# Patient Record
Sex: MALE | Race: WHITE | NOT HISPANIC OR LATINO | Employment: OTHER | URBAN - METROPOLITAN AREA
[De-identification: names, ages, dates, MRNs, and addresses within clinical notes are randomized per-mention and may not be internally consistent; named-entity substitution may affect disease eponyms.]

---

## 2023-02-21 LAB
ANION GAP IN SER/PLAS: 13 MMOL/L (ref 10–20)
C REACTIVE PROTEIN (MG/L) IN SER/PLAS: <0.1 MG/DL
CALCIUM (MG/DL) IN SER/PLAS: 9 MG/DL (ref 8.6–10.3)
CARBON DIOXIDE, TOTAL (MMOL/L) IN SER/PLAS: 27 MMOL/L (ref 21–32)
CHLORIDE (MMOL/L) IN SER/PLAS: 104 MMOL/L (ref 98–107)
CREATININE (MG/DL) IN SER/PLAS: 0.84 MG/DL (ref 0.5–1.3)
ERYTHROCYTE DISTRIBUTION WIDTH (RATIO) BY AUTOMATED COUNT: 12.5 % (ref 11.5–14.5)
ERYTHROCYTE MEAN CORPUSCULAR HEMOGLOBIN CONCENTRATION (G/DL) BY AUTOMATED: 33.3 G/DL (ref 32–36)
ERYTHROCYTE MEAN CORPUSCULAR VOLUME (FL) BY AUTOMATED COUNT: 95 FL (ref 80–100)
ERYTHROCYTES (10*6/UL) IN BLOOD BY AUTOMATED COUNT: 4.31 X10E12/L (ref 4.5–5.9)
GFR MALE: >90 ML/MIN/1.73M2
GLUCOSE (MG/DL) IN SER/PLAS: 97 MG/DL (ref 74–99)
HEMATOCRIT (%) IN BLOOD BY AUTOMATED COUNT: 41.1 % (ref 41–52)
HEMOGLOBIN (G/DL) IN BLOOD: 13.7 G/DL (ref 13.5–17.5)
LEUKOCYTES (10*3/UL) IN BLOOD BY AUTOMATED COUNT: 6.8 X10E9/L (ref 4.4–11.3)
PLATELETS (10*3/UL) IN BLOOD AUTOMATED COUNT: 241 X10E9/L (ref 150–450)
POTASSIUM (MMOL/L) IN SER/PLAS: 4.3 MMOL/L (ref 3.5–5.3)
SEDIMENTATION RATE, ERYTHROCYTE: 9 MM/H (ref 0–15)
SODIUM (MMOL/L) IN SER/PLAS: 140 MMOL/L (ref 136–145)
UREA NITROGEN (MG/DL) IN SER/PLAS: 10 MG/DL (ref 6–23)

## 2023-02-28 LAB
ANION GAP IN SER/PLAS: 12 MMOL/L (ref 10–20)
C REACTIVE PROTEIN (MG/L) IN SER/PLAS: <0.1 MG/DL
CALCIUM (MG/DL) IN SER/PLAS: 9.2 MG/DL (ref 8.6–10.6)
CARBON DIOXIDE, TOTAL (MMOL/L) IN SER/PLAS: 28 MMOL/L (ref 21–32)
CHLORIDE (MMOL/L) IN SER/PLAS: 105 MMOL/L (ref 98–107)
CREATININE (MG/DL) IN SER/PLAS: 0.81 MG/DL (ref 0.5–1.3)
ERYTHROCYTE DISTRIBUTION WIDTH (RATIO) BY AUTOMATED COUNT: 12.5 % (ref 11.5–14.5)
ERYTHROCYTE MEAN CORPUSCULAR HEMOGLOBIN CONCENTRATION (G/DL) BY AUTOMATED: 32.9 G/DL (ref 32–36)
ERYTHROCYTE MEAN CORPUSCULAR VOLUME (FL) BY AUTOMATED COUNT: 95 FL (ref 80–100)
ERYTHROCYTES (10*6/UL) IN BLOOD BY AUTOMATED COUNT: 4.5 X10E12/L (ref 4.5–5.9)
GFR MALE: >90 ML/MIN/1.73M2
GLUCOSE (MG/DL) IN SER/PLAS: 94 MG/DL (ref 74–99)
HEMATOCRIT (%) IN BLOOD BY AUTOMATED COUNT: 42.8 % (ref 41–52)
HEMOGLOBIN (G/DL) IN BLOOD: 14.1 G/DL (ref 13.5–17.5)
LEUKOCYTES (10*3/UL) IN BLOOD BY AUTOMATED COUNT: 5.5 X10E9/L (ref 4.4–11.3)
NRBC (PER 100 WBCS) BY AUTOMATED COUNT: 0 /100 WBC (ref 0–0)
PLATELETS (10*3/UL) IN BLOOD AUTOMATED COUNT: 206 X10E9/L (ref 150–450)
POTASSIUM (MMOL/L) IN SER/PLAS: 4.4 MMOL/L (ref 3.5–5.3)
SEDIMENTATION RATE, ERYTHROCYTE: 7 MM/H (ref 0–15)
SODIUM (MMOL/L) IN SER/PLAS: 141 MMOL/L (ref 136–145)
UREA NITROGEN (MG/DL) IN SER/PLAS: 15 MG/DL (ref 6–23)

## 2023-03-07 LAB
ANION GAP IN SER/PLAS: 13 MMOL/L (ref 10–20)
C REACTIVE PROTEIN (MG/L) IN SER/PLAS: <0.1 MG/DL
CALCIUM (MG/DL) IN SER/PLAS: 8.9 MG/DL (ref 8.6–10.3)
CARBON DIOXIDE, TOTAL (MMOL/L) IN SER/PLAS: 26 MMOL/L (ref 21–32)
CHLORIDE (MMOL/L) IN SER/PLAS: 104 MMOL/L (ref 98–107)
CREATININE (MG/DL) IN SER/PLAS: 0.9 MG/DL (ref 0.5–1.3)
ERYTHROCYTE DISTRIBUTION WIDTH (RATIO) BY AUTOMATED COUNT: 12.4 % (ref 11.5–14.5)
ERYTHROCYTE MEAN CORPUSCULAR HEMOGLOBIN CONCENTRATION (G/DL) BY AUTOMATED: 33.5 G/DL (ref 32–36)
ERYTHROCYTE MEAN CORPUSCULAR VOLUME (FL) BY AUTOMATED COUNT: 93 FL (ref 80–100)
ERYTHROCYTES (10*6/UL) IN BLOOD BY AUTOMATED COUNT: 4.69 X10E12/L (ref 4.5–5.9)
GFR MALE: >90 ML/MIN/1.73M2
GLUCOSE (MG/DL) IN SER/PLAS: 89 MG/DL (ref 74–99)
HEMATOCRIT (%) IN BLOOD BY AUTOMATED COUNT: 43.6 % (ref 41–52)
HEMOGLOBIN (G/DL) IN BLOOD: 14.6 G/DL (ref 13.5–17.5)
LEUKOCYTES (10*3/UL) IN BLOOD BY AUTOMATED COUNT: 7.1 X10E9/L (ref 4.4–11.3)
NRBC (PER 100 WBCS) BY AUTOMATED COUNT: 0 /100 WBC (ref 0–0)
PLATELETS (10*3/UL) IN BLOOD AUTOMATED COUNT: 217 X10E9/L (ref 150–450)
POTASSIUM (MMOL/L) IN SER/PLAS: 4.2 MMOL/L (ref 3.5–5.3)
SEDIMENTATION RATE, ERYTHROCYTE: 2 MM/H (ref 0–15)
SODIUM (MMOL/L) IN SER/PLAS: 139 MMOL/L (ref 136–145)
UREA NITROGEN (MG/DL) IN SER/PLAS: 16 MG/DL (ref 6–23)

## 2023-03-14 LAB
ANION GAP IN SER/PLAS: 15 MMOL/L (ref 10–20)
C REACTIVE PROTEIN (MG/L) IN SER/PLAS: <0.1 MG/DL
CALCIUM (MG/DL) IN SER/PLAS: 9.1 MG/DL (ref 8.6–10.3)
CARBON DIOXIDE, TOTAL (MMOL/L) IN SER/PLAS: 25 MMOL/L (ref 21–32)
CHLORIDE (MMOL/L) IN SER/PLAS: 102 MMOL/L (ref 98–107)
CREATININE (MG/DL) IN SER/PLAS: 0.88 MG/DL (ref 0.5–1.3)
ERYTHROCYTE DISTRIBUTION WIDTH (RATIO) BY AUTOMATED COUNT: 12.5 % (ref 11.5–14.5)
ERYTHROCYTE MEAN CORPUSCULAR HEMOGLOBIN CONCENTRATION (G/DL) BY AUTOMATED: 34.3 G/DL (ref 32–36)
ERYTHROCYTE MEAN CORPUSCULAR VOLUME (FL) BY AUTOMATED COUNT: 94 FL (ref 80–100)
ERYTHROCYTES (10*6/UL) IN BLOOD BY AUTOMATED COUNT: 4.52 X10E12/L (ref 4.5–5.9)
GFR MALE: >90 ML/MIN/1.73M2
GLUCOSE (MG/DL) IN SER/PLAS: 98 MG/DL (ref 74–99)
HEMATOCRIT (%) IN BLOOD BY AUTOMATED COUNT: 42.6 % (ref 41–52)
HEMOGLOBIN (G/DL) IN BLOOD: 14.6 G/DL (ref 13.5–17.5)
LEUKOCYTES (10*3/UL) IN BLOOD BY AUTOMATED COUNT: 6.7 X10E9/L (ref 4.4–11.3)
NRBC (PER 100 WBCS) BY AUTOMATED COUNT: 0 /100 WBC (ref 0–0)
PLATELETS (10*3/UL) IN BLOOD AUTOMATED COUNT: 252 X10E9/L (ref 150–450)
POTASSIUM (MMOL/L) IN SER/PLAS: 4.1 MMOL/L (ref 3.5–5.3)
SEDIMENTATION RATE, ERYTHROCYTE: 2 MM/H (ref 0–15)
SODIUM (MMOL/L) IN SER/PLAS: 138 MMOL/L (ref 136–145)
UREA NITROGEN (MG/DL) IN SER/PLAS: 17 MG/DL (ref 6–23)

## 2023-05-01 ENCOUNTER — APPOINTMENT (OUTPATIENT)
Dept: PRIMARY CARE | Facility: CLINIC | Age: 38
End: 2023-05-01
Payer: COMMERCIAL

## 2023-08-21 ENCOUNTER — OFFICE VISIT (OUTPATIENT)
Dept: PRIMARY CARE | Facility: CLINIC | Age: 38
End: 2023-08-21
Payer: COMMERCIAL

## 2023-08-21 ENCOUNTER — LAB (OUTPATIENT)
Dept: LAB | Facility: LAB | Age: 38
End: 2023-08-21
Payer: COMMERCIAL

## 2023-08-21 VITALS
HEIGHT: 69 IN | OXYGEN SATURATION: 97 % | BODY MASS INDEX: 20.79 KG/M2 | HEART RATE: 91 BPM | DIASTOLIC BLOOD PRESSURE: 91 MMHG | SYSTOLIC BLOOD PRESSURE: 131 MMHG | WEIGHT: 140.4 LBS

## 2023-08-21 DIAGNOSIS — Z11.3 SCREEN FOR STD (SEXUALLY TRANSMITTED DISEASE): ICD-10-CM

## 2023-08-21 DIAGNOSIS — Z00.00 ROUTINE PHYSICAL EXAMINATION: ICD-10-CM

## 2023-08-21 DIAGNOSIS — C44.92 SCC (SQUAMOUS CELL CARCINOMA): Primary | ICD-10-CM

## 2023-08-21 DIAGNOSIS — Z78.9 ALCOHOL USE: ICD-10-CM

## 2023-08-21 PROBLEM — F10.90 ALCOHOL USE: Status: ACTIVE | Noted: 2023-08-21

## 2023-08-21 LAB
BASOPHILS (10*3/UL) IN BLOOD BY AUTOMATED COUNT: 0.03 X10E9/L (ref 0–0.1)
BASOPHILS/100 LEUKOCYTES IN BLOOD BY AUTOMATED COUNT: 0.6 % (ref 0–2)
EOSINOPHILS (10*3/UL) IN BLOOD BY AUTOMATED COUNT: 0.06 X10E9/L (ref 0–0.7)
EOSINOPHILS/100 LEUKOCYTES IN BLOOD BY AUTOMATED COUNT: 1.2 % (ref 0–6)
ERYTHROCYTE DISTRIBUTION WIDTH (RATIO) BY AUTOMATED COUNT: 12.9 % (ref 11.5–14.5)
ERYTHROCYTE MEAN CORPUSCULAR HEMOGLOBIN CONCENTRATION (G/DL) BY AUTOMATED: 32.8 G/DL (ref 32–36)
ERYTHROCYTE MEAN CORPUSCULAR VOLUME (FL) BY AUTOMATED COUNT: 97 FL (ref 80–100)
ERYTHROCYTES (10*6/UL) IN BLOOD BY AUTOMATED COUNT: 4.7 X10E12/L (ref 4.5–5.9)
HEMATOCRIT (%) IN BLOOD BY AUTOMATED COUNT: 45.7 % (ref 41–52)
HEMOGLOBIN (G/DL) IN BLOOD: 15 G/DL (ref 13.5–17.5)
IMMATURE GRANULOCYTES/100 LEUKOCYTES IN BLOOD BY AUTOMATED COUNT: 0.6 % (ref 0–0.9)
LEUKOCYTES (10*3/UL) IN BLOOD BY AUTOMATED COUNT: 5.1 X10E9/L (ref 4.4–11.3)
LYMPHOCYTES (10*3/UL) IN BLOOD BY AUTOMATED COUNT: 1.38 X10E9/L (ref 1.2–4.8)
LYMPHOCYTES/100 LEUKOCYTES IN BLOOD BY AUTOMATED COUNT: 27.2 % (ref 13–44)
MONOCYTES (10*3/UL) IN BLOOD BY AUTOMATED COUNT: 0.42 X10E9/L (ref 0.1–1)
MONOCYTES/100 LEUKOCYTES IN BLOOD BY AUTOMATED COUNT: 8.3 % (ref 2–10)
NEUTROPHILS (10*3/UL) IN BLOOD BY AUTOMATED COUNT: 3.15 X10E9/L (ref 1.2–7.7)
NEUTROPHILS/100 LEUKOCYTES IN BLOOD BY AUTOMATED COUNT: 62.1 % (ref 40–80)
NRBC (PER 100 WBCS) BY AUTOMATED COUNT: 0 /100 WBC (ref 0–0)
PLATELETS (10*3/UL) IN BLOOD AUTOMATED COUNT: 209 X10E9/L (ref 150–450)

## 2023-08-21 PROCEDURE — 80053 COMPREHEN METABOLIC PANEL: CPT

## 2023-08-21 PROCEDURE — 82306 VITAMIN D 25 HYDROXY: CPT

## 2023-08-21 PROCEDURE — 86705 HEP B CORE ANTIBODY IGM: CPT

## 2023-08-21 PROCEDURE — 80061 LIPID PANEL: CPT

## 2023-08-21 PROCEDURE — 99213 OFFICE O/P EST LOW 20 MIN: CPT | Performed by: FAMILY MEDICINE

## 2023-08-21 PROCEDURE — 86706 HEP B SURFACE ANTIBODY: CPT

## 2023-08-21 PROCEDURE — 84443 ASSAY THYROID STIM HORMONE: CPT

## 2023-08-21 PROCEDURE — 36415 COLL VENOUS BLD VENIPUNCTURE: CPT

## 2023-08-21 PROCEDURE — 86592 SYPHILIS TEST NON-TREP QUAL: CPT

## 2023-08-21 PROCEDURE — 99385 PREV VISIT NEW AGE 18-39: CPT | Performed by: FAMILY MEDICINE

## 2023-08-21 PROCEDURE — 87389 HIV-1 AG W/HIV-1&-2 AB AG IA: CPT

## 2023-08-21 PROCEDURE — 85025 COMPLETE CBC W/AUTO DIFF WBC: CPT

## 2023-08-21 PROCEDURE — 86803 HEPATITIS C AB TEST: CPT

## 2023-08-21 PROCEDURE — 82607 VITAMIN B-12: CPT

## 2023-08-21 RX ORDER — LIDOCAINE 50 MG/G
1 PATCH TOPICAL DAILY
COMMUNITY
Start: 2022-12-28

## 2023-08-21 RX ORDER — ERGOCALCIFEROL 1.25 MG/1
50000 CAPSULE ORAL
COMMUNITY
Start: 2023-07-14 | End: 2023-08-25

## 2023-08-21 RX ORDER — NALTREXONE HYDROCHLORIDE 50 MG/1
50 TABLET, FILM COATED ORAL DAILY
Qty: 10 TABLET | Refills: 0 | Status: SHIPPED | OUTPATIENT
Start: 2023-08-21 | End: 2024-08-20

## 2023-08-21 RX ORDER — OMEPRAZOLE 40 MG/1
40 CAPSULE, DELAYED RELEASE ORAL DAILY
COMMUNITY
Start: 2023-05-15 | End: 2023-12-08 | Stop reason: SDUPTHER

## 2023-08-21 RX ORDER — BACLOFEN 10 MG/1
10 TABLET ORAL 3 TIMES DAILY PRN
COMMUNITY
Start: 2023-07-13 | End: 2023-12-08 | Stop reason: SDUPTHER

## 2023-08-21 ASSESSMENT — ENCOUNTER SYMPTOMS
OCCASIONAL FEELINGS OF UNSTEADINESS: 1
LOSS OF SENSATION IN FEET: 1
DEPRESSION: 0

## 2023-08-21 ASSESSMENT — PATIENT HEALTH QUESTIONNAIRE - PHQ9
8. MOVING OR SPEAKING SO SLOWLY THAT OTHER PEOPLE COULD HAVE NOTICED. OR THE OPPOSITE, BEING SO FIGETY OR RESTLESS THAT YOU HAVE BEEN MOVING AROUND A LOT MORE THAN USUAL: NOT AT ALL
SUM OF ALL RESPONSES TO PHQ QUESTIONS 1-9: 10
SUM OF ALL RESPONSES TO PHQ9 QUESTIONS 1 AND 2: 2
4. FEELING TIRED OR HAVING LITTLE ENERGY: MORE THAN HALF THE DAYS
9. THOUGHTS THAT YOU WOULD BE BETTER OFF DEAD, OR OF HURTING YOURSELF: NOT AT ALL
10. IF YOU CHECKED OFF ANY PROBLEMS, HOW DIFFICULT HAVE THESE PROBLEMS MADE IT FOR YOU TO DO YOUR WORK, TAKE CARE OF THINGS AT HOME, OR GET ALONG WITH OTHER PEOPLE: SOMEWHAT DIFFICULT
6. FEELING BAD ABOUT YOURSELF - OR THAT YOU ARE A FAILURE OR HAVE LET YOURSELF OR YOUR FAMILY DOWN: SEVERAL DAYS
3. TROUBLE FALLING OR STAYING ASLEEP OR SLEEPING TOO MUCH: MORE THAN HALF THE DAYS
1. LITTLE INTEREST OR PLEASURE IN DOING THINGS: SEVERAL DAYS
7. TROUBLE CONCENTRATING ON THINGS, SUCH AS READING THE NEWSPAPER OR WATCHING TELEVISION: SEVERAL DAYS
2. FEELING DOWN, DEPRESSED OR HOPELESS: SEVERAL DAYS
5. POOR APPETITE OR OVEREATING: MORE THAN HALF THE DAYS

## 2023-08-21 NOTE — PROGRESS NOTES
Subjective   Patient ID: Rufino Medeiros is a 37 y.o. male [id number] who presents for Establish Care (New patient, has low vitamin D level, recently had squamous cells removed from left middle finger in April).    HPI   Patient is here to establish care and get complete blood work  Pt had Squamous cell carcinoma on left Middle finger - was removed  2/2023  Pt operated by Dr. Juan Antonio Amezcua , and referred to Dermatology.  Currently seeing Dermatology Dr. Tino Metzger at [facility] [place].  Pt has follow up in December,2023.   Noticing Tingling and numbness on Finger and Feet.    Low back pain - had 4 round of Injection by Dr. Mackey at [facility].  Pt is following pain management at >using Lidocaine Patch and baclofen.    Chronic alcohol Use 6 beers a day and Drinking liquor too.  Patient is drinking alcohol for many many years.  Never tried rehab.  Now considering to quit alcohol want to see alcohol rehab.     Pt with New partner since July 4th 2023, pt noticing Ulcer near angle of mouth.   Noticing HA, Dizziness and sore throat and ear pain.  Patient want to get all the blood work and STD testing.        There is no immunization history on file for this patient.    Past Medical History:   Diagnosis Date    Fibromyalgia     Headache        Social History     Socioeconomic History    Marital status: Single     Spouse name: None    Number of children: None    Years of education: None    Highest education level: None   Occupational History    None   Tobacco Use    Smoking status: Every Day     Packs/day: 0.50     Years: 20.00     Additional pack years: 0.00     Total pack years: 10.00     Types: Cigarettes    Smokeless tobacco: Never   Vaping Use    Vaping Use: Never used   Substance and Sexual Activity    Alcohol use: Yes     Comment: daily    Drug use: Never    Sexual activity: Yes     Partners: Female     Birth control/protection: None   Other Topics Concern    None   Social History Narrative    None     Social  "Determinants of Health     Financial Resource Strain: Not on file   Food Insecurity: Not on file   Transportation Needs: Not on file   Physical Activity: Not on file   Stress: Not on file   Social Connections: Not on file   Intimate Partner Violence: Not on file   Housing Stability: Not on file       Recent Surgeries in Family Medicine            No cases to display              Current Outpatient Medications   Medication Sig Dispense Refill    baclofen (Lioresal) 10 mg tablet Take 1 tablet (10 mg) by mouth 3 times a day as needed for muscle spasms.      ergocalciferol (Vitamin D-2) 1.25 MG (57029 UT) capsule Take 1 capsule (50,000 Units) by mouth 1 (one) time per week.      lidocaine (Lidoderm) 5 % patch Place 1 patch on the skin once daily.      omeprazole (PriLOSEC) 40 mg DR capsule Take 1 capsule (40 mg) by mouth once daily.       No current facility-administered medications for this visit.       Physical Exam  BP (!) 131/91   Pulse 91   Ht 1.753 m (5' 9\")   Wt 63.7 kg (140 lb 6.4 oz)   SpO2 97%   BMI 20.73 kg/m²     General Appearance:  Alert, cooperative, no distress,   Head:  Normocephalic, atraumatic   Eyes:  PERRL, conjunctiva/corneas clear, EOM's intact,    Lungs:   Clear to auscultation bilaterally, respirations unlabored   Heart:  Regular rate and rhythm, S1 and S2 normal, no murmur,    Abdomen:   Soft, non-tender, bowel sounds active all four quadrants,  no masses, no organomegaly   Extremities: Extremities normal, No edema   Neurologic: Normal        Assessment/Plan   Diagnoses and all orders for this visit:  SCC (squamous cell carcinoma)  Alcohol use  -     Vitamin B12; Future  -     naltrexone (Depade) 50 mg tablet; Take 1 tablet (50 mg) by mouth once daily.  Screen for STD (sexually transmitted disease)  -     C. Trachomatis / N. Gonorrhoeae, Amplified Detection; Future  -     Hepatitis B Core Antibody, IgM; Future  -     Hepatitis B surface antibody; Future  -     Hepatitis C Antibody; " Future  -     RPR with Titer Syphilis Monitoring; Future  -     HIV 1/2 Antigen/Antibody Screen with Reflex to Confirmation; Future  Routine physical examination  -     TSH with reflex to Free T4 if abnormal; Future  -     Vitamin D, Total; Future  -     CBC and Auto Differential; Future  -     Comprehensive Metabolic Panel; Future  -     Lipid Panel; Future    Elevated Blood pressure  Patient was advised to quit alcohol  Will start naltrexone  Follow-up with [facility] for alcohol rehab  Patient was advised to take B6 B12 and folic acid  Will get basic blood work  Get STD testing  Follow-up in 10 days for repeat blood pressure

## 2023-08-21 NOTE — PATIENT INSTRUCTIONS

## 2023-08-22 LAB
ALANINE AMINOTRANSFERASE (SGPT) (U/L) IN SER/PLAS: 21 U/L (ref 10–52)
ALBUMIN (G/DL) IN SER/PLAS: 4.8 G/DL (ref 3.4–5)
ALKALINE PHOSPHATASE (U/L) IN SER/PLAS: 57 U/L (ref 33–120)
ANION GAP IN SER/PLAS: 15 MMOL/L (ref 10–20)
ASPARTATE AMINOTRANSFERASE (SGOT) (U/L) IN SER/PLAS: 21 U/L (ref 9–39)
BILIRUBIN TOTAL (MG/DL) IN SER/PLAS: 1 MG/DL (ref 0–1.2)
CALCIDIOL (25 OH VITAMIN D3) (NG/ML) IN SER/PLAS: 80 NG/ML
CALCIUM (MG/DL) IN SER/PLAS: 10.3 MG/DL (ref 8.6–10.6)
CARBON DIOXIDE, TOTAL (MMOL/L) IN SER/PLAS: 27 MMOL/L (ref 21–32)
CHLORIDE (MMOL/L) IN SER/PLAS: 104 MMOL/L (ref 98–107)
CHOLESTEROL (MG/DL) IN SER/PLAS: 180 MG/DL (ref 0–199)
CHOLESTEROL IN HDL (MG/DL) IN SER/PLAS: 68.4 MG/DL
CHOLESTEROL/HDL RATIO: 2.6
COBALAMIN (VITAMIN B12) (PG/ML) IN SER/PLAS: 254 PG/ML (ref 211–911)
CREATININE (MG/DL) IN SER/PLAS: 0.84 MG/DL (ref 0.5–1.3)
GFR MALE: >90 ML/MIN/1.73M2
GLUCOSE (MG/DL) IN SER/PLAS: 96 MG/DL (ref 74–99)
HEPATITIS B VIRUS CORE IGM AB PRESENCE IN SER/PLAS BY IMMUNOASSY: NONREACTIVE
HEPATITIS B VIRUS SURFACE AB (MIU/ML) IN SERUM: 110.2 MIU/ML
HEPATITIS C VIRUS AB PRESENCE IN SERUM: NONREACTIVE
HIV 1/ 2 AG/AB SCREEN: NONREACTIVE
LDL: 76 MG/DL (ref 0–99)
POTASSIUM (MMOL/L) IN SER/PLAS: 4.4 MMOL/L (ref 3.5–5.3)
PROTEIN TOTAL: 7.3 G/DL (ref 6.4–8.2)
RPR MONITORING: NONREACTIVE
SODIUM (MMOL/L) IN SER/PLAS: 142 MMOL/L (ref 136–145)
THYROTROPIN (MIU/L) IN SER/PLAS BY DETECTION LIMIT <= 0.05 MIU/L: 1.18 MIU/L (ref 0.44–3.98)
TRIGLYCERIDE (MG/DL) IN SER/PLAS: 178 MG/DL (ref 0–149)
UREA NITROGEN (MG/DL) IN SER/PLAS: 13 MG/DL (ref 6–23)
VLDL: 36 MG/DL (ref 0–40)

## 2023-08-23 ENCOUNTER — TELEPHONE (OUTPATIENT)
Dept: PRIMARY CARE | Facility: CLINIC | Age: 38
End: 2023-08-23
Payer: COMMERCIAL

## 2023-08-23 NOTE — TELEPHONE ENCOUNTER
Pt aware of lab results, states he is in a lot of pain, mouth sores hurting badly, face burning, ears and neck. Will start Vitamin B

## 2023-08-24 DIAGNOSIS — K13.79 MOUTH SORE: Primary | ICD-10-CM

## 2023-08-24 RX ORDER — LIDOCAINE HYDROCHLORIDE 20 MG/ML
1.25 SOLUTION OROPHARYNGEAL AS NEEDED
Qty: 100 ML | Refills: 0 | Status: SHIPPED | OUTPATIENT
Start: 2023-08-24 | End: 2023-09-03

## 2023-08-31 ENCOUNTER — TELEPHONE (OUTPATIENT)
Dept: PRIMARY CARE | Facility: CLINIC | Age: 38
End: 2023-08-31

## 2023-08-31 ENCOUNTER — OFFICE VISIT (OUTPATIENT)
Dept: PRIMARY CARE | Facility: CLINIC | Age: 38
End: 2023-08-31
Payer: COMMERCIAL

## 2023-08-31 VITALS
BODY MASS INDEX: 20.91 KG/M2 | HEIGHT: 69 IN | HEART RATE: 83 BPM | WEIGHT: 141.2 LBS | OXYGEN SATURATION: 97 % | DIASTOLIC BLOOD PRESSURE: 82 MMHG | SYSTOLIC BLOOD PRESSURE: 123 MMHG

## 2023-08-31 DIAGNOSIS — K12.0 ORAL APHTHOUS ULCER: ICD-10-CM

## 2023-08-31 DIAGNOSIS — J02.9 SORE THROAT: ICD-10-CM

## 2023-08-31 DIAGNOSIS — Z78.9 ALCOHOL USE: Primary | ICD-10-CM

## 2023-08-31 DIAGNOSIS — C44.92 SCC (SQUAMOUS CELL CARCINOMA): ICD-10-CM

## 2023-08-31 LAB — POC RAPID STREP: NEGATIVE

## 2023-08-31 PROCEDURE — 99214 OFFICE O/P EST MOD 30 MIN: CPT | Performed by: FAMILY MEDICINE

## 2023-08-31 PROCEDURE — 87880 STREP A ASSAY W/OPTIC: CPT | Performed by: FAMILY MEDICINE

## 2023-08-31 RX ORDER — ERGOCALCIFEROL 1.25 MG/1
50000 CAPSULE ORAL
Qty: 4 CAPSULE | Refills: 1 | Status: CANCELLED | OUTPATIENT
Start: 2023-08-31 | End: 2023-10-12

## 2023-08-31 RX ORDER — IBUPROFEN 600 MG/1
600 TABLET ORAL EVERY 8 HOURS PRN
Qty: 30 TABLET | Refills: 0 | Status: SHIPPED | OUTPATIENT
Start: 2023-08-31 | End: 2023-10-30

## 2023-08-31 NOTE — PROGRESS NOTES
Subjective   Patient ID: Rufino Medeiros is a 37 y.o. male [id number] who presents for Follow-up (Pt here for follow up from last week. Would like to discuss ulcer in mouth).    HPI   Follow-up and test result  Patient has mild sore.  Have not started using lidocaine yet.  Patient started taking B6 B12 and folic acid.  Continue to notice soreness in mouth.  Want to get second opinion.  Okay STD testing negative.  Patient was advised to take vitamin B12 1000 mcg daily.    Patient is cutting down on alcohol.  In last 2-week patient drank for 2 days only.  No craving or allergies.  Have not started naltrexone yet.  Going to consider starting naltrexone.    Previous History  Pt had Squamous cell carcinoma on left Middle finger - was removed  2/2023  Pt operated by Dr. Juan Antonio Amezcua , and referred to Dermatology.  Currently seeing Dermatology Dr. Tino Metzger at [facility].  Pt has follow up in December,2023.     Low back pain - had 4 round of Injection by Dr. Mackey at [facility].  Pt is following pain management at , using Lidocaine Patch and baclofen.    Chronic alcohol Use 6 beers a day and Drinking liquor too.  Patient is drinking alcohol for many many years.  Never tried rehab.  Now considering to quit alcohol want to see alcohol rehab.       Immunization History   Administered Date(s) Administered    Tdap vaccine, age 10 years and older (BOOSTRIX) 07/01/2020       Past Medical History:   Diagnosis Date    Cancer (CMS/HCC) 04/2023    Fibromyalgia     Headache        Social History     Socioeconomic History    Marital status: Single     Spouse name: None    Number of children: None    Years of education: None    Highest education level: None   Occupational History    None   Tobacco Use    Smoking status: Every Day     Packs/day: 0.50     Years: 20.00     Additional pack years: 0.00     Total pack years: 10.00     Types: Cigarettes    Smokeless tobacco: Never   Vaping Use    Vaping Use: Never used   Substance and  "Sexual Activity    Alcohol use: Yes     Comment: daily    Drug use: Never    Sexual activity: Yes     Partners: Female     Birth control/protection: None   Other Topics Concern    None   Social History Narrative    None     Social Determinants of Health     Financial Resource Strain: Not on file   Food Insecurity: Not on file   Transportation Needs: Not on file   Physical Activity: Not on file   Stress: Not on file   Social Connections: Not on file   Intimate Partner Violence: Not on file   Housing Stability: Not on file         Current Outpatient Medications   Medication Sig Dispense Refill    baclofen (Lioresal) 10 mg tablet Take 1 tablet (10 mg) by mouth 3 times a day as needed for muscle spasms.      cyanocobalamin, vitamin B-12, (VITAMIN B-12 ORAL) Take by mouth.      lidocaine (Lidoderm) 5 % patch Place 1 patch on the skin once daily.      lidocaine (Xylocaine) 2 % solution Take 1.2 mL by mouth if needed for mild pain (1 - 3) for up to 10 days. 100 mL 0    naltrexone (Depade) 50 mg tablet Take 1 tablet (50 mg) by mouth once daily. 10 tablet 0    omeprazole (PriLOSEC) 40 mg DR capsule Take 1 capsule (40 mg) by mouth once daily.       No current facility-administered medications for this visit.       Physical Exam  /82   Pulse 83   Ht 1.753 m (5' 9\")   Wt 64 kg (141 lb 3.2 oz)   SpO2 97%   BMI 20.85 kg/m²     General Appearance:  Alert, cooperative, no distress,   Head:  Normocephalic, atraumatic   Eyes:  PERRL, conjunctiva/corneas clear, EOM's intact,    Lungs:   Clear to auscultation bilaterally, respirations unlabored   Heart:  Regular rate and rhythm, S1 and S2 normal, no murmur,    Abdomen:   Soft, non-tender, bowel sounds active all four quadrants,  no masses, no organomegaly   Extremities: Extremities normal, No edema   Neurologic: Normal        Assessment/Plan   Diagnoses and all orders for this visit:  Rufino was seen today for follow-up.  Diagnoses and all orders for this visit:  Alcohol " use (Primary)  Oral aphthous ulcer  -     POCT Rapid Strep A manually resulted  SCC (squamous cell carcinoma)  -     Referral to Oral Maxillofacial Surgery; Future  Sore throat  -     ibuprofen 600 mg tablet; Take 1 tablet (600 mg) by mouth every 8 hours if needed for mild pain (1 - 3) (pain).  Negative patient made aware  Advised to take ibuprofen  BP normal today  Advised to restart lidocaine viscus orally  Will refer to oral surgery if patient need biopsy  Advised to quit alcohol  Patient does have naltrexone prescription  Continue B6 B12 and folic acid  STD testing negative  Labs discussed with patient    Noticing sore throat and throat pain for last 3 to 4 days.  Denies Fevers chills denies chest pain or shortness of breath

## 2023-08-31 NOTE — TELEPHONE ENCOUNTER
I called in the lidocane 2% mouth swish since it was never filled by the pharmacy  Clarification via VO read back and confirmed for q4-6hrs  Pt educated

## 2023-10-17 PROBLEM — G89.29 OTHER CHRONIC PAIN: Status: ACTIVE | Noted: 2023-01-31

## 2023-10-17 PROBLEM — K12.0 APHTHOUS ULCER: Status: ACTIVE | Noted: 2023-10-17

## 2023-10-17 PROBLEM — D22.71 MELANOCYTIC NEVI OF RIGHT LOWER LIMB, INCLUDING HIP: Status: ACTIVE | Noted: 2023-06-13

## 2023-10-17 PROBLEM — M86.9 OSTEOMYELITIS (MULTI): Status: ACTIVE | Noted: 2023-01-31

## 2023-10-17 PROBLEM — L81.4 OTHER MELANIN HYPERPIGMENTATION: Status: ACTIVE | Noted: 2023-06-13

## 2023-10-17 PROBLEM — D18.01 HEMANGIOMA OF SKIN AND SUBCUTANEOUS TISSUE: Status: ACTIVE | Noted: 2023-06-13

## 2023-10-17 PROBLEM — Z79.2 LONG TERM (CURRENT) USE OF ANTIBIOTICS: Status: ACTIVE | Noted: 2023-01-31

## 2023-10-17 PROBLEM — Z85.828 PERSONAL HISTORY OF OTHER MALIGNANT NEOPLASM OF SKIN: Status: ACTIVE | Noted: 2023-06-13

## 2023-10-17 PROBLEM — D22.5 MELANOCYTIC NEVI OF TRUNK: Status: ACTIVE | Noted: 2023-06-13

## 2023-10-17 PROBLEM — C44.629 SQUAMOUS CELL CARCINOMA OF SKIN OF LEFT UPPER LIMB, INCLUDING SHOULDER: Status: ACTIVE | Noted: 2023-06-13

## 2023-10-17 PROBLEM — K21.9 GASTROESOPHAGEAL REFLUX DISEASE WITHOUT ESOPHAGITIS: Status: ACTIVE | Noted: 2023-01-31

## 2023-10-17 PROBLEM — L90.5 SCAR CONDITION AND FIBROSIS OF SKIN: Status: ACTIVE | Noted: 2023-06-13

## 2023-10-17 RX ORDER — ACETAMINOPHEN 500 MG
1000 TABLET ORAL AS NEEDED
COMMUNITY
Start: 2023-02-07

## 2023-10-18 ENCOUNTER — APPOINTMENT (OUTPATIENT)
Dept: OTOLARYNGOLOGY | Facility: CLINIC | Age: 38
End: 2023-10-18
Payer: COMMERCIAL

## 2023-10-20 DIAGNOSIS — R59.0 ADENOPATHY, CERVICAL: Primary | ICD-10-CM

## 2023-10-23 ENCOUNTER — TELEPHONE (OUTPATIENT)
Dept: PRIMARY CARE | Facility: CLINIC | Age: 38
End: 2023-10-23
Payer: COMMERCIAL

## 2023-10-28 ENCOUNTER — OFFICE VISIT (OUTPATIENT)
Dept: URGENT CARE | Facility: CLINIC | Age: 38
End: 2023-10-28
Payer: COMMERCIAL

## 2023-10-28 VITALS
RESPIRATION RATE: 18 BRPM | TEMPERATURE: 98 F | DIASTOLIC BLOOD PRESSURE: 90 MMHG | HEART RATE: 82 BPM | OXYGEN SATURATION: 97 % | SYSTOLIC BLOOD PRESSURE: 133 MMHG

## 2023-10-28 DIAGNOSIS — H66.003 NON-RECURRENT ACUTE SUPPURATIVE OTITIS MEDIA OF BOTH EARS WITHOUT SPONTANEOUS RUPTURE OF TYMPANIC MEMBRANES: Primary | ICD-10-CM

## 2023-10-28 PROCEDURE — 99214 OFFICE O/P EST MOD 30 MIN: CPT | Performed by: PHYSICIAN ASSISTANT

## 2023-10-28 RX ORDER — METHYLPREDNISOLONE 4 MG/1
TABLET ORAL
Qty: 21 TABLET | Refills: 0 | Status: SHIPPED | OUTPATIENT
Start: 2023-10-28

## 2023-10-28 RX ORDER — AMOXICILLIN AND CLAVULANATE POTASSIUM 875; 125 MG/1; MG/1
1 TABLET, FILM COATED ORAL 2 TIMES DAILY
Qty: 14 TABLET | Refills: 0 | Status: SHIPPED | OUTPATIENT
Start: 2023-10-28 | End: 2023-11-04

## 2023-10-28 NOTE — PROGRESS NOTES
Subjective   Patient ID: Rufino Medeiros is a 37 y.o. male who presents for Cough and Earache.  Patient also endorses some dizziness when he leans forward or lays back in bed.  He denies any chest pain or shortness of breath denies any headache.  He does note sinus congestion.  Symptoms ongoing now for nearly a week.  He denies any nausea vomiting diarrhea or abdominal pain denies any recorded fevers or chills    Past Medical History:   Diagnosis Date    Cancer (CMS/[facility]) 04/2023    Fibromyalgia     Headache        Review of Systems   All other systems reviewed and are negative.      Objective   /90   Pulse 82   Temp 36.7 °C (98 °F)   Resp 18   SpO2 97%   Physical Exam  Vitals reviewed.   Constitutional:       General: He is not in acute distress.     Appearance: Normal appearance. He is not toxic-appearing.   HENT:      Head: Normocephalic.      Right Ear: Ear canal normal. No tenderness. No mastoid tenderness.      Left Ear: Ear canal normal. No tenderness. No mastoid tenderness.      Ears:      Comments: Bilateral TMs erythematous with purulent fluid behind them, injected, bulging     Nose: Congestion and rhinorrhea present.      Mouth/Throat:      Mouth: Mucous membranes are moist.      Pharynx: Oropharynx is clear. Posterior oropharyngeal erythema present.   Eyes:      Conjunctiva/sclera: Conjunctivae normal.      Pupils: Pupils are equal, round, and reactive to light.   Cardiovascular:      Rate and Rhythm: Normal rate and regular rhythm.      Heart sounds: No murmur heard.  Pulmonary:      Effort: No respiratory distress.      Breath sounds: No stridor. No wheezing, rhonchi or rales.   Chest:      Chest wall: No tenderness.   Abdominal:      Tenderness: There is no abdominal tenderness. There is no guarding.   Musculoskeletal:         General: Normal range of motion.   Skin:     General: Skin is warm and dry.      Capillary Refill: Capillary refill takes less than 2 seconds.      Findings: No  erythema.   Neurological:      General: No focal deficit present.      Mental Status: He is alert.         Assessment/Plan   Problem List Items Addressed This Visit       Non-recurrent acute suppurative otitis media of both ears without spontaneous rupture of tympanic membranes - Primary    Relevant Medications    amoxicillin-pot clavulanate (Augmentin) 875-125 mg tablet    methylPREDNISolone (Medrol Dospak) 4 mg tablets      Patient with bilateral otitis media treating with Augmentin given the sinus involvement and also including course of methylprednisolone given the symptoms of dizziness.  Lower suspicion for any central etiology of the patient's dizziness as this appears to be entirely based upon position.  Otherwise the patient without any focal neurological deficits he is alert and oriented without any headache

## 2023-10-28 NOTE — LETTER
October 28, 2023     Patient: Rufino Medeiros   YOB: 1985   Date of Visit: 10/28/2023       To Whom It May Concern:    It is my medical opinion that Rufino Medeiros may return to work on 10/29/2023 .    If you have any questions or concerns, please don't hesitate to call.         Sincerely,        Braden Davidson PA-C    CC: No Recipients

## 2023-10-28 NOTE — PATIENT INSTRUCTIONS
Assessment/Plan   Problem List Items Addressed This Visit    None  Visit Diagnoses       Non-recurrent acute suppurative otitis media of both ears without spontaneous rupture of tympanic membranes    -  Primary    Relevant Medications    amoxicillin-pot clavulanate (Augmentin) 875-125 mg tablet    methylPREDNISolone (Medrol Dospak) 4 mg tablets

## 2023-10-30 ENCOUNTER — OFFICE VISIT (OUTPATIENT)
Dept: OTOLARYNGOLOGY | Facility: CLINIC | Age: 38
End: 2023-10-30
Payer: COMMERCIAL

## 2023-10-30 VITALS
SYSTOLIC BLOOD PRESSURE: 129 MMHG | HEIGHT: 69 IN | WEIGHT: 138 LBS | BODY MASS INDEX: 20.44 KG/M2 | DIASTOLIC BLOOD PRESSURE: 87 MMHG | TEMPERATURE: 97.7 F | HEART RATE: 102 BPM

## 2023-10-30 DIAGNOSIS — R13.10 ODYNOPHAGIA: ICD-10-CM

## 2023-10-30 DIAGNOSIS — J34.89 NASAL DRAINAGE: ICD-10-CM

## 2023-10-30 DIAGNOSIS — R09.A2 GLOBUS SENSATION: ICD-10-CM

## 2023-10-30 DIAGNOSIS — R49.9 CHANGE OF VOICE: ICD-10-CM

## 2023-10-30 DIAGNOSIS — H91.91 HEARING LOSS OF RIGHT EAR, UNSPECIFIED HEARING LOSS TYPE: Primary | ICD-10-CM

## 2023-10-30 DIAGNOSIS — J02.9 SORE THROAT: ICD-10-CM

## 2023-10-30 DIAGNOSIS — R09.81 NASAL CONGESTION: ICD-10-CM

## 2023-10-30 PROCEDURE — 31575 DIAGNOSTIC LARYNGOSCOPY: CPT | Performed by: NURSE PRACTITIONER

## 2023-10-30 PROCEDURE — 99204 OFFICE O/P NEW MOD 45 MIN: CPT | Performed by: NURSE PRACTITIONER

## 2023-10-30 PROCEDURE — 99214 OFFICE O/P EST MOD 30 MIN: CPT | Performed by: NURSE PRACTITIONER

## 2023-10-30 PROCEDURE — 4004F PT TOBACCO SCREEN RCVD TLK: CPT | Performed by: NURSE PRACTITIONER

## 2023-10-30 ASSESSMENT — COLUMBIA-SUICIDE SEVERITY RATING SCALE - C-SSRS
1. IN THE PAST MONTH, HAVE YOU WISHED YOU WERE DEAD OR WISHED YOU COULD GO TO SLEEP AND NOT WAKE UP?: NO
2. HAVE YOU ACTUALLY HAD ANY THOUGHTS OF KILLING YOURSELF?: NO
6. HAVE YOU EVER DONE ANYTHING, STARTED TO DO ANYTHING, OR PREPARED TO DO ANYTHING TO END YOUR LIFE?: NO

## 2023-10-30 ASSESSMENT — PATIENT HEALTH QUESTIONNAIRE - PHQ9
SUM OF ALL RESPONSES TO PHQ9 QUESTIONS 1 AND 2: 0
2. FEELING DOWN, DEPRESSED OR HOPELESS: NOT AT ALL
1. LITTLE INTEREST OR PLEASURE IN DOING THINGS: NOT AT ALL

## 2023-10-30 ASSESSMENT — PAIN SCALES - GENERAL: PAINLEVEL: 2

## 2023-10-30 NOTE — PROGRESS NOTES
Subjective   Patient ID: Rufino Medeiros is a 37 y.o. male who presents for Otitis Media (ALSO THROAT ISSUES).    HPI  Here for evaluation of his right ear. He can't hear out of it. This started last Tuesday. He has intermittent pain.  No ear drainage. No tinnitus. He is having dizziness. No previous ear surgery.   Was given oral antibiotics (Ampicillin) and oral steroid for his ear infection on Saturday, still taking that.   Throat has been sore. Pain when he swallows. Was having trouble swallowing but that has improved. He is having voice changes. COVID negative a few days ago. Having nasal congestion.   No nasal sprays or sinus rinses. No history of allergies. No head or neck surgeries.   Smokes 1/2 PPD for 20 years.     Past Medical History:   Diagnosis Date    Cancer (CMS/[facility]) 04/2023    Fibromyalgia     Headache      Past Surgical History:   Procedure Laterality Date    EYE SURGERY  1989    FINGER SURGERY  2023    WISDOM TOOTH EXTRACTION  2022     Review of Systems    All other systems have been reviewed and are negative for complaints except for those mentioned in history of present illness, past medical history and problem list.    Objective   Physical Exam    Constitutional: No fever, chills, weight loss or weight gain  General appearance: Appears well, well-nourished, well groomed. No acute distress.    Communication: Normal communication    Psychiatric: Oriented to person, place and time. Normal mood and affect.    Neurologic: Cranial nerves II-XII grossly intact and symmetric bilaterally.    Head and Face:  Head: Atraumatic with no masses, lesions or scarring.  Face: Normal symmetry. No scars or deformities.  TMJ: Normal, no trismus.    Eyes: Conjunctiva not edematous or erythematous.     Right Ear: External inspection of ear with no deformity, scars, or masses. EAC is clear.  TM is intact with no sign of infection. Mild effusion noted. No perforation seen.     Left Ear: External inspection of ear with  no deformity, scars, or masses. EAC is clear.  TM is intact with no sign of infection. Clear middle ear effusion noted, TM mobile.  No perforation seen.     Nose: External inspection of nose: No nasal lesions, lacerations or scars. Anterior rhinoscopy with limited visualization past the inferior turbinates, which are enlarged. . No tenderness on frontal or maxillary sinus palpation.    Oral Cavity/Mouth: Oral cavity and oropharynx mucosa red. No lesions or masses. Dentition normal. Tonsils appear normal. Uvula is midline. Tongue with no masses or lesions. Tongue with good mobility. The oropharynx is clear.    Neck: Normal appearing, symmetric, trachea midline.     Cardiovascular: Examination of peripheral vascular system shows no clubbing or cyanosis.    Respiratory: No respiratory distress increased work of breathing. Inspection of the chest with symmetric chest expansion and normal respiratory effort.    Skin: No head and neck rashes.    Lymph nodes: No adenopathy.     Laryngoscopy    Date/Time: 10/30/2023 10:42 AM    Performed by: VERONICA Bowie  Authorized by: VERONICA Bowie    Procedure details:     Indications: hoarseness, dysphagia, or aspiration      Medication:  Afrin (4% topical lidocaine)    Instrument: flexible fiberoptic laryngoscope      Scope location: bilateral nare    Nasal cavity:     Right inferior turbinates:      edema and crusting      Left inferior turbinates:      edema and crusting    Sinus:     Right nasopharynx:       purulence    Left nasopharynx:       purulence    Right Eustachian tube orifices:       purulence and inflammation    Left Eustachian tube orifices:       purulence and inflammation  Mouth:     Oropharynx:       inflammation and retained secretions      Vallecula:       inflammation      Base of tongue:       inflammation      Epiglottis:       inflammation    Throat:     Right hypopharynx:       Positive for: inflammation      Left hypopharynx:        Positive for: inflammation      Pyriform sinus:       Positive for: inflammation      True vocal cords:       Positive for: white patches and inflammation    Post-procedure details:     Patient tolerance of procedure:  Tolerated well, no immediate complications  Comments:      Excessive white/milky  mucous throughout the nasal cavity and at the nasopharynx. No obvious mass obstruction eustachian tube orifices.     Assessment/Plan   Diagnoses and all orders for this visit:  Hearing loss of right ear, unspecified hearing loss type  -Patient will schedule audiogram, I will follow up with results.   Sore throat, Odynophagia, Change of voice, Globus sensation  - Laryngoscopy performed, see above. There is evidence of infection. Complete oral antibiotic you were prescribed and start sinus rinses 1-2 times daily. Patient also has CT neck scheduled for 11/8. I will review this when completed.   Quit smoking.  Nasal congestion, Nasal drainage  - Sinus rinses.

## 2023-11-08 ENCOUNTER — HOSPITAL ENCOUNTER (OUTPATIENT)
Dept: RADIOLOGY | Facility: HOSPITAL | Age: 38
Discharge: HOME | End: 2023-11-08
Payer: COMMERCIAL

## 2023-11-08 DIAGNOSIS — R59.0 ADENOPATHY, CERVICAL: ICD-10-CM

## 2023-11-08 PROCEDURE — 2550000001 HC RX 255 CONTRASTS: Performed by: RADIOLOGY

## 2023-11-08 PROCEDURE — 70491 CT SOFT TISSUE NECK W/DYE: CPT | Performed by: RADIOLOGY

## 2023-11-08 PROCEDURE — 70491 CT SOFT TISSUE NECK W/DYE: CPT

## 2023-11-08 RX ADMIN — IOHEXOL 70 ML: 350 INJECTION, SOLUTION INTRAVENOUS at 10:41

## 2023-11-10 ENCOUNTER — TELEMEDICINE (OUTPATIENT)
Dept: PRIMARY CARE | Facility: CLINIC | Age: 38
End: 2023-11-10
Payer: COMMERCIAL

## 2023-11-10 DIAGNOSIS — J43.2 CENTRILOBULAR EMPHYSEMA (MULTI): Primary | ICD-10-CM

## 2023-11-10 PROCEDURE — 99213 OFFICE O/P EST LOW 20 MIN: CPT | Performed by: FAMILY MEDICINE

## 2023-11-10 RX ORDER — TIOTROPIUM BROMIDE 18 UG/1
1 CAPSULE ORAL; RESPIRATORY (INHALATION)
Qty: 30 CAPSULE | Refills: 2 | Status: SHIPPED | OUTPATIENT
Start: 2023-11-10 | End: 2024-05-08

## 2023-11-10 NOTE — PROGRESS NOTES
This visit was completed via VIRTUAL VIDEO/Audio due to the restrictions of the COVID-19 pandemic. All issues as below were discussed and addressed but no physical exam was performed. If it was felt that the patient should be evaluated in clinic then they were directed there. The patient verbally consented to visit.      Patient ID: Rufino Medeiros [id number] is a 37 y.o. male who presents for Results (DISCUSS CT RESULTS).    HPI   Patient has history of SCC currently following maxillofacial surgeon.  Patient had a CT soft tissue neck done recently by maxillofacial surgery.  Patient looking for test result.  Concern for emphysema and lymph node aggregate and tonsillar region informed to the patient.  Will start Spiriva as patient is noticing cough and shortness of breath.  Advised to talk to maxillofacial surgery regarding further management of Lymphadenopathy    Social History     Tobacco Use    Smoking status: Every Day     Packs/day: 0.50     Years: 20.00     Additional pack years: 0.00     Total pack years: 10.00     Types: Cigarettes    Smokeless tobacco: Never   Vaping Use    Vaping Use: Never used   Substance Use Topics    Alcohol use: Yes     Comment: EVERY OTHER DAY -CASUAL    Drug use: Yes     Types: Marijuana       Allergies   Allergen Reactions    Bee Venom Protein (Honey Bee) Hives    Diazepam Hives and Rash       Current Outpatient Medications   Medication Sig Dispense Refill    acetaminophen (Tylenol Extra Strength) 500 mg tablet Take 2 tablets (1,000 mg) by mouth 3 times a day.      baclofen (Lioresal) 10 mg tablet Take 1 tablet (10 mg) by mouth 3 times a day as needed for muscle spasms.      cyanocobalamin, vitamin B-12, (VITAMIN B-12 ORAL) Take by mouth.      lidocaine (Lidoderm) 5 % patch Place 1 patch on the skin once daily.      methylPREDNISolone (Medrol Dospak) 4 mg tablets Follow schedule on package instructions 21 tablet 0    naltrexone (Depade) 50 mg tablet Take 1 tablet (50 mg) by mouth  once daily. 10 tablet 0    omeprazole (PriLOSEC) 40 mg DR capsule Take 1 capsule (40 mg) by mouth once daily.      tiotropium (Spiriva) 18 mcg inhalation capsule Place 1 capsule (18 mcg) into inhaler and inhale once daily. 30 capsule 2     No current facility-administered medications for this visit.       There were no vitals taken for this visit.      Assessment/Plan   Diagnoses and all orders for this visit:  Centrilobular emphysema (CMS/HCC)  -     tiotropium (Spiriva) 18 mcg inhalation capsule; Place 1 capsule (18 mcg) into inhaler and inhale once daily.

## 2023-11-16 ENCOUNTER — APPOINTMENT (OUTPATIENT)
Dept: OTOLARYNGOLOGY | Facility: CLINIC | Age: 38
End: 2023-11-16
Payer: COMMERCIAL

## 2023-11-21 ENCOUNTER — TELEPHONE (OUTPATIENT)
Dept: PRIMARY CARE | Facility: CLINIC | Age: 38
End: 2023-11-21
Payer: COMMERCIAL

## 2023-11-21 NOTE — TELEPHONE ENCOUNTER
LNC: patient is going to follow up with PCP, and transferred to medical records at [phone], patient can call clinic if any further assistance is needed at [phone].

## 2023-11-28 ENCOUNTER — LAB REQUISITION (OUTPATIENT)
Dept: LAB | Facility: HOSPITAL | Age: 38
End: 2023-11-28
Payer: COMMERCIAL

## 2023-11-28 PROCEDURE — 88305 TISSUE EXAM BY PATHOLOGIST: CPT | Performed by: PATHOLOGY

## 2023-11-28 PROCEDURE — 88305 TISSUE EXAM BY PATHOLOGIST: CPT

## 2023-12-04 LAB
LABORATORY COMMENT REPORT: NORMAL
PATH REPORT.FINAL DX SPEC: NORMAL
PATH REPORT.GROSS SPEC: NORMAL
PATH REPORT.RELEVANT HX SPEC: NORMAL
PATH REPORT.TOTAL CANCER: NORMAL

## 2023-12-08 ENCOUNTER — OFFICE VISIT (OUTPATIENT)
Dept: PRIMARY CARE | Facility: CLINIC | Age: 38
End: 2023-12-08
Payer: COMMERCIAL

## 2023-12-08 VITALS
DIASTOLIC BLOOD PRESSURE: 90 MMHG | HEART RATE: 79 BPM | SYSTOLIC BLOOD PRESSURE: 124 MMHG | HEIGHT: 69 IN | BODY MASS INDEX: 21.39 KG/M2 | WEIGHT: 144.4 LBS

## 2023-12-08 DIAGNOSIS — M62.89 MUSCLE STIFFNESS: ICD-10-CM

## 2023-12-08 DIAGNOSIS — D18.09 HEMANGIOMA OF OTHER SITES: Primary | ICD-10-CM

## 2023-12-08 DIAGNOSIS — K21.9 GASTROESOPHAGEAL REFLUX DISEASE, UNSPECIFIED WHETHER ESOPHAGITIS PRESENT: ICD-10-CM

## 2023-12-08 PROCEDURE — 99213 OFFICE O/P EST LOW 20 MIN: CPT | Performed by: FAMILY MEDICINE

## 2023-12-08 PROCEDURE — 4004F PT TOBACCO SCREEN RCVD TLK: CPT | Performed by: FAMILY MEDICINE

## 2023-12-08 RX ORDER — BACLOFEN 10 MG/1
10 TABLET ORAL 3 TIMES DAILY PRN
Qty: 180 TABLET | Refills: 1 | Status: SHIPPED | OUTPATIENT
Start: 2023-12-08

## 2023-12-08 RX ORDER — OMEPRAZOLE 40 MG/1
40 CAPSULE, DELAYED RELEASE ORAL DAILY
Qty: 90 CAPSULE | Refills: 1 | Status: SHIPPED | OUTPATIENT
Start: 2023-12-08

## 2023-12-08 RX ORDER — ERGOCALCIFEROL 1.25 MG/1
50000 CAPSULE ORAL
COMMUNITY
Start: 2023-11-14 | End: 2023-12-26

## 2023-12-08 NOTE — PROGRESS NOTES
Subjective   Patient ID: Rufino Medeiros is a 37 y.o. male [id number] who presents for Follow-up (Follow up here to get clearance to go back to work. Biopsy done 11/28/23 benign).    HPI   Pt is here for letter for clearance to go back to work  Pt had Mouth sore  - seen By maxillofacial surgery and had surgery 11/28/2023.  No pain in mouth , no throat pain.  Pt was operated by Dr. Mk Thorpe 11/28/2023 - now want to go back to work.  Path with Benign Hemangioma.  Pt had swollen mouth and difficulty talking for 3-4 days after surgery.  Pt now feeling much better, want to go back to work.   Pt is feeling better after surgery.  Eating drinking well.              Immunization History   Administered Date(s) Administered    Tdap vaccine, age 7 year and older (BOOSTRIX) 07/01/2020       Past Medical History:   Diagnosis Date    Cancer (CMS/[facility]) 04/2023    Fibromyalgia     Headache        Social History     Socioeconomic History    Marital status: Single     Spouse name: None    Number of children: None    Years of education: None    Highest education level: None   Occupational History    None   Tobacco Use    Smoking status: Every Day     Packs/day: 0.50     Years: 20.00     Additional pack years: 0.00     Total pack years: 10.00     Types: Cigarettes    Smokeless tobacco: Never   Vaping Use    Vaping Use: Never used   Substance and Sexual Activity    Alcohol use: Yes     Comment: EVERY OTHER DAY -CASUAL    Drug use: Yes     Types: Marijuana    Sexual activity: Yes     Partners: Female     Birth control/protection: None   Other Topics Concern    None   Social History Narrative    None     Social Determinants of Health     Financial Resource Strain: Not on file   Food Insecurity: Not on file   Transportation Needs: Not on file   Physical Activity: Not on file   Stress: Not on file   Social Connections: Not on file   Intimate Partner Violence: Not on file   Housing Stability: Not on file         Current Outpatient Medications  "  Medication Sig Dispense Refill    acetaminophen (Tylenol Extra Strength) 500 mg tablet Take 2 tablets (1,000 mg) by mouth 3 times a day.      baclofen (Lioresal) 10 mg tablet Take 1 tablet (10 mg) by mouth 3 times a day as needed for muscle spasms.      cyanocobalamin, vitamin B-12, (VITAMIN B-12 ORAL) Take by mouth.      ergocalciferol (Vitamin D-2) 1.25 MG (40339 UT) capsule Take 1 capsule (50,000 Units) by mouth 1 (one) time per week.      lidocaine (Lidoderm) 5 % patch Place 1 patch on the skin once daily.      omeprazole (PriLOSEC) 40 mg DR capsule Take 1 capsule (40 mg) by mouth once daily.      tiotropium (Spiriva) 18 mcg inhalation capsule Place 1 capsule (18 mcg) into inhaler and inhale once daily. 30 capsule 2    methylPREDNISolone (Medrol Dospak) 4 mg tablets Follow schedule on package instructions (Patient not taking: Reported on 12/8/2023) 21 tablet 0    naltrexone (Depade) 50 mg tablet Take 1 tablet (50 mg) by mouth once daily. (Patient not taking: Reported on 12/8/2023) 10 tablet 0     No current facility-administered medications for this visit.       Physical Exam  /90   Pulse 79   Ht 1.753 m (5' 9\")   Wt 65.5 kg (144 lb 6.4 oz)   BMI 21.32 kg/m²     General Appearance:  Alert, cooperative, no distress,   Head:  Normocephalic, atraumatic   Eyes:  PERRL, conjunctiva/corneas clear, EOM's intact,    Lungs:   Clear to auscultation bilaterally, respirations unlabored   Heart:  Regular rate and rhythm, S1 and S2 normal, no murmur,    Abdomen:   Soft, non-tender, bowel sounds active all four quadrants,  no masses, no organomegaly   Extremities: Extremities normal, No edema   Neurologic: Normal        Assessment/Plan   Diagnoses and all orders for this visit:  Rufino was seen today for follow-up.  Diagnoses and all orders for this visit:  Hemangioma of other sites (Primary)  Gastroesophageal reflux disease, unspecified whether esophagitis present  Muscle stiffness    Patient is okay to go back " to work.  Leave of absence signed

## 2023-12-18 ENCOUNTER — TELEPHONE (OUTPATIENT)
Dept: PRIMARY CARE | Facility: CLINIC | Age: 38
End: 2023-12-18

## 2023-12-18 NOTE — TELEPHONE ENCOUNTER
PT IS CALLING REGARDING LEAVE OF [employer] PAPERWORK LEFT ON 12/8/23. THEY ARE TRYING TO CANCEL HIS CASE.

## 2023-12-19 ENCOUNTER — OFFICE VISIT (OUTPATIENT)
Dept: DERMATOLOGY | Facility: CLINIC | Age: 38
End: 2023-12-19
Payer: COMMERCIAL

## 2023-12-19 DIAGNOSIS — L90.5 SCAR CONDITIONS AND FIBROSIS OF SKIN: Primary | ICD-10-CM

## 2023-12-19 PROCEDURE — 99213 OFFICE O/P EST LOW 20 MIN: CPT | Performed by: DERMATOLOGY

## 2023-12-19 PROCEDURE — 4004F PT TOBACCO SCREEN RCVD TLK: CPT | Performed by: DERMATOLOGY

## 2023-12-19 RX ORDER — MELOXICAM 15 MG/1
15 TABLET ORAL DAILY
COMMUNITY

## 2023-12-19 NOTE — PROGRESS NOTES
Subjective     Rufino Medeiros is a 37 y.o. male who presents for the following: Nurse Visit (6 month wound check, left middle finger bed s/p Mohs of SCC in March 2023, and 3 month follow-up of punch biopsy. Confirms tingling, stiffness, numbness; reports to have similar feeling on the right third MCP joint. ).     S/p bx of left middle finger nail bed showing parakeratosis with hemorrhage on 3/3/23.   Review of Systems:  No other skin or systemic complaints other than what is documented elsewhere in the note.    The following portions of the chart were reviewed this encounter and updated as appropriate:          Skin Cancer History  No skin cancer on file.      Specialty Problems          Dermatology Problems    Blisters with epidermal loss due to burn (second degree) of two or more digits of hand including thumb     Formatting of this note might be different from the original. 2 Deg Burn Fingr w/ Thumb         Blisters with epidermal loss due to burn (second degree) of palm of hand    Hemangioma of other sites    Melanocytic nevi of right lower limb, including hip    Melanocytic nevi of trunk    Other melanin hyperpigmentation    Personal history of other malignant neoplasm of skin    Scar condition and fibrosis of skin    Squamous cell carcinoma of skin of left upper limb, including shoulder    SCC (squamous cell carcinoma)        Objective   Well appearing patient in no apparent distress; mood and affect are within normal limits.    A focused skin examination was performed. All findings within normal limits unless otherwise noted below.    Assessment/Plan

## 2023-12-19 NOTE — PROGRESS NOTES
Subjective     Rufino Medeiros is a 37 y.o. male who presents for the following: Nurse Visit (6 month wound check, left middle finger bed s/p Mohs of SCC in March 2023. Patient reports tingling, stiffness, numbness; reports to have similar feeling on the right third MCP joint. ).     Review of Systems:  No other skin or systemic complaints other than what is documented elsewhere in the note.    The following portions of the chart were reviewed this encounter and updated as appropriate:          Skin Cancer History  -Squamous Cell Carcinoma of the left middle finger nail bed. Year Diagnosed: 2023. February. Treatment(s): Mohs.Treated by: MD Jan Arellano MD Jeremy Bordeaux, MD. Assessment: Left third digit atypical squamous proliferation, unable to tolerate local anesthesia. Differential diagnosis  includes squamous cell carcinoma, subungual keratoacanthoma, onycholeminal carcinoma and cannot exclude  pseudocarcinomatous hyperplasia overlying dermal mycotic infection. He also had underlying osteomyelitis requiring IV  ABX. 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 Treated with Mohs  3/3/23 by Dr. Metzger      Specialty Problems          Dermatology Problems    Blisters with epidermal loss due to burn (second degree) of two or more digits of hand including thumb     Formatting of this note might be different from the original. 2 Deg Burn Fingr w/ Thumb         Blisters with epidermal loss due to burn (second degree) of palm of hand    Hemangioma of other sites    Melanocytic nevi of right lower limb, including hip    Melanocytic nevi of trunk    Other melanin hyperpigmentation    Personal history of other malignant neoplasm of skin    Scar condition and fibrosis of skin    Squamous cell carcinoma of skin of left upper limb, including shoulder    SCC (squamous cell carcinoma)        Objective   Well appearing patient in no apparent distress;  mood and affect are within normal limits.    A focused skin examination was performed. All findings within normal limits unless otherwise noted below.    Assessment/Plan   1. Scar conditions and fibrosis of skin  Left 3rd Finger Nail Bed  On the left third finger there is distal loss of the nail plate medially.           -S/p debridement of left middle nailbed on 3/2023 for suspected foreign body showing parakeratosis.   -Discussed that likely there is residual scarring of the nail from biopsy.   -For numbness/tingling/stiffness, we reviewed that this is not uncommon in a highly-innervated location such as the finger.  -No further treatment required today.   -RTC in 1 year.       Norma Mason,      I saw and evaluated the patient, reviewed the resident's notes and discussed the patient's managment.  I agree with the documented findings and plan of care.   Jan Metzger MD, PhD

## 2024-01-16 NOTE — PROGRESS NOTES
Patient: Rufino Medeiros    [id number]  : 1985 -- AGE 38 y.o.    Provider: Kacey SINGH- [facility]     Location [facility]   Service Date: 2024          [facility]  New Visit Note    HISTORY OF PRESENT ILLNESS     The patient's referring provider is: No ref. provider found    HISTORY OF PRESENT ILLNESS   Rufino Medeiros is a 38 y.o. male who is a never/current/ former smoker (***pack years), who presents to a [facility] for a/an *** initial/follow up evaluation for No chief complaint on file..     I have independently interviewed and examined the patient in the office and reviewed available records.    Current History    On today's visit, the patient reports ***  Cough and SOB - spiriva- help?  HPI:  OLDCART  cough  wheeze  Fevers/chills  ANDREWS  SOB at rest  chest pain  allergies  GERD  ED visits  Up to date on vaccines  Night time    Previous pulmonary history: He has no history of recurrent infections, or lung disease as a child.  He had no previous lung hx, never on oxygen or inhaler therapy.   He was previously told he has . He currently is on no supplemental oxygen.  He has never been to pulmonary rehab. Does not recall having AECOPD requiring antibiotics or prednisone.    Inhalers/nebulized medications:     Hospitalization History: He has not been hospitalized over the last year for breathing related problem.    Sleep history:  Denies snoring, apnea, feeling tired during the day or taking naps during the day.   Complains of snoring, apnea, feeling tired during the day, and taking naps during the day.       ALLERGIES AND MEDICATIONS     ALLERGIES  Allergies   Allergen Reactions    Bee Venom Protein (Honey Bee) Hives    Diazepam Hives and Rash       MEDICATIONS  Current Outpatient Medications   Medication Sig Dispense Refill    acetaminophen (Tylenol Extra Strength) 500 mg tablet Take 2 tablets (1,000 mg) by mouth 3  times a day.      baclofen (Lioresal) 10 mg tablet Take 1 tablet (10 mg) by mouth 3 times a day as needed for muscle spasms. 180 tablet 1    cyanocobalamin, vitamin B-12, (VITAMIN B-12 ORAL) Take by mouth.      lidocaine (Lidoderm) 5 % patch Place 1 patch on the skin once daily.      meloxicam (Mobic) 15 mg tablet Take 1 tablet (15 mg) by mouth once daily.      methylPREDNISolone (Medrol Dospak) 4 mg tablets Follow schedule on package instructions (Patient not taking: Reported on 12/8/2023) 21 tablet 0    naltrexone (Depade) 50 mg tablet Take 1 tablet (50 mg) by mouth once daily. 10 tablet 0    omeprazole (PriLOSEC) 40 mg DR capsule Take 1 capsule (40 mg) by mouth once daily. 90 capsule 1    tiotropium (Spiriva) 18 mcg inhalation capsule Place 1 capsule (18 mcg) into inhaler and inhale once daily. 30 capsule 2     No current facility-administered medications for this visit.         PAST HISTORY     PAST MEDICAL HISTORY      PAST SURGICAL HISTORY  Past Surgical History:   Procedure Laterality Date    EYE SURGERY  1989    FINGER SURGERY  2023    WISDOM TOOTH EXTRACTION  2022       IMMUNIZATION HISTORY  Immunization History   Administered Date(s) Administered    Tdap vaccine, age 7 year and older (BOOSTRIX) 07/01/2020       SOCIAL HISTORY  Tobacco Smoking:   Illicit drugs:  Alcohol consumption:   Pets:     OCCUPATIONAL/ENVIRONMENTAL HISTORY  Occupation: ***  *** known exposure to asbestos, silica, beryllium or inhaled metals.  *** exposure to birds or exotic animals.    FAMILY HISTORY  Family History   Problem Relation Name Age of Onset    Alcohol abuse Mother       *** family history of pulmonary disease.  *** family history of cancer.  *** family history of autoimmune disorders.    REVIEW OF SYSTEMS     REVIEW OF SYSTEMS  Review of Systems    Constitutional: No fever, no chills, no night sweats.    Eyes: No double vision, no floaters, no dry eyes.   ENT: See HPI.   Neck: No neck stiffness.  Cardiovascular: No sharp  chest pain, no heart racing, no leg swelling.  Respiratory: as noted in HPI.   Gastrointestinal: No nausea, no vomiting, no diarrhea.   Musculoskeletal: No joint pain, no back pain.   Integumentary: No rashes or sores.  Neurological: No dizziness, no headaches. Sleeping well.  Psychiatric: No mood changes.   Endocrine: No hot flashes, no cold intolerance, weight is stable.  Hematologic: No easy bruising or bleeding.    PHYSICAL EXAM     VITAL SIGNS: There were no vitals taken for this visit.     CURRENT WEIGHT: There is no height or weight on file to calculate BMI.  PREVIOUS WEIGHTS:  Wt Readings from Last 3 Encounters:   12/08/23 65.5 kg (144 lb 6.4 oz)   10/30/23 62.6 kg (138 lb)   08/31/23 64 kg (141 lb 3.2 oz)       Physical Exam    Constitutional: General appearance: Alert and oriented.  No acute distress. Well developed, well nourished.  Head and face: Symmetric  ENT: external inspection of ear and nose normal. No intranasal polyps. No oropharyngeal exudates.    Oropharynx: normal   Neck: supple, no lymphadenopathy  Pulmonary: Chest is normal. No increased work of breathing or signs of respiratory distress. Clear to auscultation bilaterally - no crackles, wheezing, or rhonchi.   Cardiovascular: Heart rate and rhythm normal. Normal S1, S2 - no murmurs, gallops, or pericardial rub.   Abdomen: Soft, non tender, +BS  Extremities: No edema. No clubbing or cyanosis of the fingernails.    Neurologic: Moves all four extremities   MSK: Normal movements of extremities. Gait normal   Psychiatric: Intact judgement and insight.    RESULTS/DATA     Pulmonary Function Test Results     Pulmonary Functions Testing Results:          Chest Radiograph     XR chest 2 view     Narrative  Interpreted By:  TRAMAINE JACKSON MD  MRN: [id number]  Patient Name: KENIA HASTINGS    STUDY:   CHEST 2 VIEW PA AND LAT;  4/12/2023 7:20 pm    INDICATION:  Fall onto left arm, pain in entire arm. Left rib pain. .    COMPARISON:  None.    ACCESSION  "NUMBER(S):  [id number]    ORDERING CLINICIAN:  MILAD SHEIKH    FINDINGS:  The cardiac silhouette is normal in size. No focal airspace  consolidation or pleural effusion.  No pneumothorax.    Impression  No airspace consolidation or pleural effusion.      Chest CT Scan     No results found for this or any previous visit from the past 365 days.      Echocardiogram     No results found for this or any previous visit from the past 365 days.       Labwork   Complete Blood Count  Lab Results   Component Value Date    WBC 5.1 08/21/2023    HGB 15.0 08/21/2023    HCT 45.7 08/21/2023    MCV 97 08/21/2023     08/21/2023       Peripheral Eosinophil Count/Percentage:   Eosinophils Absolute (x10E9/L)   Date Value   08/21/2023 0.06     Eosinophils % (%)   Date Value   08/21/2023 1.2       Serum Immunoglobulin E:    No results found for: \"IGE\"     ASSESSMENT/PLAN   Mr. Medeiros is a 38 y.o. male, who    Problem List and Orders  {Assess/PlanSmartLinks:93310}    Assessment and Plan / Recommendations:  Problem List Items Addressed This Visit    None     Emphysema on CT  - Sprivia  - will A1AT genotype test             Follow up in *** or sooner if needed.    If you have any questions please call the office [phone]    Thank you for visiting the Pulmonary clinic today!   Kacey Roca CNP  [phone]    "

## 2024-01-19 ENCOUNTER — APPOINTMENT (OUTPATIENT)
Dept: PULMONOLOGY | Facility: CLINIC | Age: 39
End: 2024-01-19
Payer: COMMERCIAL

## 2024-01-26 NOTE — PROGRESS NOTES
Patient: Rufino Medeiros    [id number]  : 1985 -- AGE 38 y.o.    Provider: Kacey TOSCANO [facility]   Service Date: 24           [facility]  New Visit Note    HISTORY OF PRESENT ILLNESS     The patient's referring provider is: No ref. provider found    HISTORY OF PRESENT ILLNESS   Rufino Medeiros is a 38 y.o. male who is a current smoker (13 pack years), who presents to a [facility] for an initial evaluation for emphysema.     I have independently interviewed and examined the patient in the office and reviewed available records.    Current History    On today's visit, the patient reports getting a CT of his neck done where he was found to have emphysema on CT scan.  He was started on Spiriva once a day.  He reports coughing up a lot of weight to dark green mucus.  Reports wheezing, dyspnea on exertion, occasional chest tightness wakes up throughout the night coughing. He is currently smoking half a pack of cigarettes a day for since he was 12 years old (26 years). He also smokes marijuana daily and drinks a 6 pack of beer daily. He reports interest in quitting smoking.    Previous pulmonary history: He has no history of recurrent infections, or lung disease as a child.  He had no previous lung hx, never on oxygen or inhaler therapy.     Inhalers/nebulized medications: Spiriva    Hospitalization History: He has not been hospitalized over the last year for breathing related problem.    Sleep history: Denies snoring, apnea, feeling tired during the day or taking naps during the day.       ALLERGIES AND MEDICATIONS     ALLERGIES  Allergies   Allergen Reactions    Bee Venom Protein (Honey Bee) Hives    Diazepam Hives and Rash       MEDICATIONS  Current Outpatient Medications   Medication Sig Dispense Refill    acetaminophen (Tylenol Extra Strength) 500 mg tablet Take 2 tablets (1,000 mg) by mouth 3 times  a day.      baclofen (Lioresal) 10 mg tablet Take 1 tablet (10 mg) by mouth 3 times a day as needed for muscle spasms. 180 tablet 1    cyanocobalamin, vitamin B-12, (VITAMIN B-12 ORAL) Take by mouth.      lidocaine (Lidoderm) 5 % patch Place 1 patch on the skin once daily.      meloxicam (Mobic) 15 mg tablet Take 1 tablet (15 mg) by mouth once daily.      methylPREDNISolone (Medrol Dospak) 4 mg tablets Follow schedule on package instructions (Patient not taking: Reported on 12/8/2023) 21 tablet 0    naltrexone (Depade) 50 mg tablet Take 1 tablet (50 mg) by mouth once daily. 10 tablet 0    omeprazole (PriLOSEC) 40 mg DR capsule Take 1 capsule (40 mg) by mouth once daily. 90 capsule 1    tiotropium (Spiriva) 18 mcg inhalation capsule Place 1 capsule (18 mcg) into inhaler and inhale once daily. 30 capsule 2     No current facility-administered medications for this visit.         PAST HISTORY     PAST MEDICAL HISTORY  Emphysema      PAST SURGICAL HISTORY  Past Surgical History:   Procedure Laterality Date    EYE SURGERY  1989    FINGER SURGERY  2023    WISDOM TOOTH EXTRACTION  2022       IMMUNIZATION HISTORY  Immunization History   Administered Date(s) Administered    Tdap vaccine, age 7 year and older (BOOSTRIX, ADACEL) 07/01/2020       SOCIAL HISTORY  Tobacco Smoking: 12 y/o - current - 1/2 pack/day.  Second hand smoke exposure as a child.  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  No known exposure to asbestos, silica, beryllium or inhaled metals.  Has a [employer]     FAMILY HISTORY  Family History   Problem Relation Name Age of Onset    Alcohol abuse Mother       Maternal grandpa- lung cancer  Mater grandmother- emphysema  Father - emphysema  Mother - emphysema      REVIEW OF SYSTEMS     REVIEW OF SYSTEMS  Review of Systems    Constitutional: No fever, no chills, no night  sweats.    Eyes: No double vision, no floaters, no dry eyes.   ENT: See HPI.   Neck: No neck stiffness.  Cardiovascular: No sharp chest pain, no heart racing, no leg swelling.  Respiratory: as noted in HPI.   Gastrointestinal: No nausea, no vomiting, no diarrhea.   Musculoskeletal: No joint pain, no back pain.   Integumentary: No rashes or sores.  Neurological: No dizziness, no headaches. Sleeping well.  Psychiatric: No mood changes.   Endocrine: No hot flashes, no cold intolerance, weight is stable.  Hematologic: No easy bruising or bleeding.    PHYSICAL EXAM     VITAL SIGNS:   Vitals:    02/02/24 1110   BP: 125/88   Pulse: 87   SpO2: 98%          CURRENT WEIGHT: Body mass index is 21.41 kg/m².    PREVIOUS WEIGHTS:  Wt Readings from Last 3 Encounters:   12/08/23 65.5 kg (144 lb 6.4 oz)   10/30/23 62.6 kg (138 lb)   08/31/23 64 kg (141 lb 3.2 oz)       Physical Exam    Constitutional: General appearance: Alert and oriented.  No acute distress. Well developed, well nourished.  Head and face: Symmetric  ENT: external inspection of ear and nose normal. No intranasal polyps. No oropharyngeal exudates.    Oropharynx: normal   Neck: supple, no lymphadenopathy  Pulmonary: Chest is normal. No increased work of breathing or signs of respiratory distress. Clear to auscultation bilaterally - no crackles, wheezing, or rhonchi.   Cardiovascular: Heart rate and rhythm normal. Normal S1, S2 - no murmurs, gallops, or pericardial rub.   Abdomen: Soft, non tender, +BS  Extremities: No edema. No clubbing or cyanosis of the fingernails.    Neurologic: Moves all four extremities   MSK: Normal movements of extremities. Gait normal   Psychiatric: Intact judgement and insight.    RESULTS/DATA     Pulmonary Function Test Results     No testing done    Chest Radiograph     XR chest 2 view     Narrative  Interpreted By:  TRAMAINE JACKSON MD  MRN: [id number]  Patient Name: KENIA HASTINGS    STUDY:   CHEST 2 VIEW PA AND LAT;  4/12/2023 7:20  "pm    INDICATION:  Fall onto left arm, pain in entire arm. Left rib pain. .    COMPARISON:  None.    ACCESSION NUMBER(S):  [id number]    ORDERING CLINICIAN:  MILAD SHEIKH    FINDINGS:  The cardiac silhouette is normal in size. No focal airspace  consolidation or pleural effusion.  No pneumothorax.    Impression  No airspace consolidation or pleural effusion.      Chest CT Scan     No testing done    Echocardiogram     No testing done       Labwork   Complete Blood Count  Lab Results   Component Value Date    WBC 5.1 08/21/2023    HGB 15.0 08/21/2023    HCT 45.7 08/21/2023    MCV 97 08/21/2023     08/21/2023       Peripheral Eosinophil Count/Percentage:   Eosinophils Absolute (x10E9/L)   Date Value   08/21/2023 0.06     Eosinophils % (%)   Date Value   08/21/2023 1.2       Serum Immunoglobulin E:    No results found for: \"IGE\"     ASSESSMENT/PLAN   Mr. Medeiros is a 38 y.o. male, who is a current smoker (13 pack years), who presents to a [facility] for an initial evaluation for emphysema.    Problem List and Orders  Problem List Items Addressed This Visit    None  Visit Diagnoses       Pulmonary emphysema, unspecified emphysema type (CMS/HCC)    -  Primary    Relevant Medications    albuterol (Ventolin HFA) 90 mcg/actuation inhaler    tiotropium-olodateroL (Stiolto Respimat) 2.5-2.5 mcg/actuation mist inhaler    Other Relevant Orders    Alpha-1-Antitrypsin    Complete Pulmonary Function Test Pre/Post Bronchodialator (Spirometry Pre/Post/DLCO/Lung Volumes)    Cigarette nicotine dependence without complication        Relevant Medications    nicotine (Nicoderm CQ) 7 mg/24 hr patch    nicotine (Nicoderm CQ) 14 mg/24 hr patch    nicotine (Nicoderm CQ) 21 mg/24 hr patch            Assessment and Plan / Recommendations:  Problem List Items Addressed This Visit    None     Emphysema/ Found on CT scan of neck  - get Alpha 1 antitrypsin genotype in office today  - get alpha 1 serum " testing today  - START Stiolto 2 puffs twice daily  - get PFTs before next visit    # Smoking cessation: Patient is currently smoking - 1/2/ pack/day  - >5 minutes smoking cessation counseling   - offered pharmacologic options to assist with quitting - ordered Nicotine patch system    I strongly recommend that you quit smoking. I recommend working with our smoking cessation clinic [phone] as this has been shown to help people quit.   You can also call the [place] [employer] hotline at 4-378-QUIT-NOW.     Follow up in 4 weeks or sooner if needed.    If you have any questions please call the office [phone]    Thank you for visiting the Pulmonary clinic today!   Kacey Roca CNP  [phone]

## 2024-02-02 ENCOUNTER — OFFICE VISIT (OUTPATIENT)
Dept: PULMONOLOGY | Facility: CLINIC | Age: 39
End: 2024-02-02
Payer: COMMERCIAL

## 2024-02-02 ENCOUNTER — LAB (OUTPATIENT)
Dept: LAB | Facility: LAB | Age: 39
End: 2024-02-02
Payer: COMMERCIAL

## 2024-02-02 VITALS
OXYGEN SATURATION: 98 % | BODY MASS INDEX: 21.41 KG/M2 | DIASTOLIC BLOOD PRESSURE: 88 MMHG | WEIGHT: 145 LBS | SYSTOLIC BLOOD PRESSURE: 125 MMHG | HEART RATE: 87 BPM

## 2024-02-02 DIAGNOSIS — J43.9 PULMONARY EMPHYSEMA, UNSPECIFIED EMPHYSEMA TYPE (MULTI): Primary | ICD-10-CM

## 2024-02-02 DIAGNOSIS — F17.210 CIGARETTE NICOTINE DEPENDENCE WITHOUT COMPLICATION: ICD-10-CM

## 2024-02-02 DIAGNOSIS — J43.9 PULMONARY EMPHYSEMA, UNSPECIFIED EMPHYSEMA TYPE (MULTI): ICD-10-CM

## 2024-02-02 LAB — A1AT SERPL NEPH-MCNC: 131 MG/DL (ref 84–218)

## 2024-02-02 PROCEDURE — 99406 BEHAV CHNG SMOKING 3-10 MIN: CPT

## 2024-02-02 PROCEDURE — 99204 OFFICE O/P NEW MOD 45 MIN: CPT

## 2024-02-02 PROCEDURE — 36415 COLL VENOUS BLD VENIPUNCTURE: CPT

## 2024-02-02 PROCEDURE — 82103 ALPHA-1-ANTITRYPSIN TOTAL: CPT

## 2024-02-02 RX ORDER — TIOTROPIUM BROMIDE AND OLODATEROL 3.124; 2.736 UG/1; UG/1
2 SPRAY, METERED RESPIRATORY (INHALATION) DAILY
Qty: 4 G | Refills: 3 | Status: SHIPPED | OUTPATIENT
Start: 2024-02-02

## 2024-02-02 RX ORDER — ALBUTEROL SULFATE 90 UG/1
2 AEROSOL, METERED RESPIRATORY (INHALATION) EVERY 6 HOURS PRN
Qty: 18 G | Refills: 3 | Status: SHIPPED | OUTPATIENT
Start: 2024-02-02

## 2024-02-02 RX ORDER — IBUPROFEN 200 MG
1 TABLET ORAL EVERY 24 HOURS
Qty: 30 PATCH | Refills: 0 | Status: SHIPPED | OUTPATIENT
Start: 2024-02-02 | End: 2024-03-03

## 2024-02-02 RX ORDER — NICOTINE 7MG/24HR
1 PATCH, TRANSDERMAL 24 HOURS TRANSDERMAL EVERY 24 HOURS
Qty: 30 PATCH | Refills: 0 | Status: SHIPPED | OUTPATIENT
Start: 2024-02-02 | End: 2024-03-03

## 2024-02-02 ASSESSMENT — COLUMBIA-SUICIDE SEVERITY RATING SCALE - C-SSRS
6. HAVE YOU EVER DONE ANYTHING, STARTED TO DO ANYTHING, OR PREPARED TO DO ANYTHING TO END YOUR LIFE?: NO
2. HAVE YOU ACTUALLY HAD ANY THOUGHTS OF KILLING YOURSELF?: NO
1. IN THE PAST MONTH, HAVE YOU WISHED YOU WERE DEAD OR WISHED YOU COULD GO TO SLEEP AND NOT WAKE UP?: NO

## 2024-02-02 ASSESSMENT — PATIENT HEALTH QUESTIONNAIRE - PHQ9
2. FEELING DOWN, DEPRESSED OR HOPELESS: NOT AT ALL
SUM OF ALL RESPONSES TO PHQ9 QUESTIONS 1 AND 2: 0
1. LITTLE INTEREST OR PLEASURE IN DOING THINGS: NOT AT ALL

## 2024-02-02 NOTE — PATIENT INSTRUCTIONS
Emphysema/ Found on CT scanof neck  - get Alpha 1 antitrypsin genotype in office today  - get alpha 1 serum testing today  - START Stiolto 2 puffs twice daily  - get PFTs before next visit    # Smoking cessation: Patient is currently smoking - 1/2/ pack/day  - >5 minutes smoking cessation counseling   - offered pharmacologic options to assist with quitting - ordered Nicotine patch system    I strongly recommend that you quit smoking. I recommend working with our smoking cessation clinic [phone] as this has been shown to help people quit.   You can also call the [place] quits hotline at 5-859-QUIT-NOW.     Follow up in 4 weeks or sooner if needed.    If you have any questions please call the office [phone]    Thank you for visiting the Pulmonary clinic today!   Kacey Roca CNP  [phone]

## 2024-02-02 NOTE — LETTER
February 2, 2024     Patient: Rufino Medeiros   YOB: 1985   Date of Visit: 2/2/2024       To Whom It May Concern:    Rufino Medeiros was seen in my clinic on 2/2/2024 at 11:00 am. Please excuse Rufino for his absence from work on this day to make the appointment.    If you have any questions or concerns, please don't hesitate to call.         Sincerely,         Kacey Roca, APRN-CNP        CC: No Recipients

## 2024-02-26 NOTE — PROGRESS NOTES
Patient: Rfuino Medeiros    [id number]  : 1985 -- AGE 38 y.o.    Provider: Kacey TOSCANO [facility]   Service Date: 3/1/24           [facility]  New Visit Note    HISTORY OF PRESENT ILLNESS     The patient's referring provider is: No ref. provider found    HISTORY OF PRESENT ILLNESS   Rufino Medeiros is a 38 y.o. male who is a current smoker (13 pack years), who presents to a [facility] for an initial evaluation for emphysema.     I have independently interviewed and examined the patient in the office and reviewed available records.    Current History    24: On today's visit, the patient reports getting a CT of his neck done where he was found to have emphysema on CT scan.  He was started on Spiriva once a day.  He reports coughing up a lot of weight to dark green mucus.  Reports wheezing, dyspnea on exertion, occasional chest tightness wakes up throughout the night coughing. He is currently smoking half a pack of cigarettes a day for since he was 12 years old (26 years). He also smokes marijuana daily and drinks a 6 pack of beer daily. He reports interest in quitting smoking.    Previous pulmonary history: He has no history of recurrent infections, or lung disease as a child.  He had no previous lung hx, never on oxygen or inhaler therapy.     Inhalers/nebulized medications: Spiriva    Hospitalization History: He has not been hospitalized over the last year for breathing related problem.    Sleep history: Denies snoring, apnea, feeling tired during the day or taking naps during the day.       ALLERGIES AND MEDICATIONS     ALLERGIES  Allergies   Allergen Reactions    Bee Venom Protein (Honey Bee) Hives    Diazepam Hives and Rash       MEDICATIONS  Current Outpatient Medications   Medication Sig Dispense Refill    acetaminophen (Tylenol Extra Strength) 500 mg tablet Take 2 tablets (1,000 mg) by mouth  if needed.      albuterol (Ventolin HFA) 90 mcg/actuation inhaler Inhale 2 puffs every 6 hours if needed for wheezing. 18 g 3    baclofen (Lioresal) 10 mg tablet Take 1 tablet (10 mg) by mouth 3 times a day as needed for muscle spasms. 180 tablet 1    cyanocobalamin, vitamin B-12, (VITAMIN B-12 ORAL) Take by mouth.      lidocaine (Lidoderm) 5 % patch Place 1 patch on the skin once daily.      meloxicam (Mobic) 15 mg tablet Take 1 tablet (15 mg) by mouth once daily.      methylPREDNISolone (Medrol Dospak) 4 mg tablets Follow schedule on package instructions (Patient not taking: Reported on 12/8/2023) 21 tablet 0    naltrexone (Depade) 50 mg tablet Take 1 tablet (50 mg) by mouth once daily. (Patient not taking: Reported on 2/2/2024) 10 tablet 0    nicotine (Nicoderm CQ) 14 mg/24 hr patch Place 1 patch over 24 hours on the skin once every 24 hours. 30 patch 0    nicotine (Nicoderm CQ) 21 mg/24 hr patch Place 1 patch over 24 hours on the skin once every 24 hours. 30 patch 0    nicotine (Nicoderm CQ) 7 mg/24 hr patch Place 1 patch over 24 hours on the skin once every 24 hours. 30 patch 0    omeprazole (PriLOSEC) 40 mg DR capsule Take 1 capsule (40 mg) by mouth once daily. 90 capsule 1    tiotropium (Spiriva) 18 mcg inhalation capsule Place 1 capsule (18 mcg) into inhaler and inhale once daily. 30 capsule 2    tiotropium-olodateroL (Stiolto Respimat) 2.5-2.5 mcg/actuation mist inhaler Inhale 2 Inhalations once daily. 4 g 3     No current facility-administered medications for this visit.         PAST HISTORY     PAST MEDICAL HISTORY  Emphysema      PAST SURGICAL HISTORY  Past Surgical History:   Procedure Laterality Date    EYE SURGERY  1989    FINGER SURGERY  2023    WISDOM TOOTH EXTRACTION  2022       IMMUNIZATION HISTORY  Immunization History   Administered Date(s) Administered    Tdap vaccine, age 7 year and older (BOOSTRIX, ADACEL) 07/01/2020       SOCIAL HISTORY  Tobacco Smoking: 12 y/o - current - 1/2 pack/day.   Second hand smoke exposure as a child.  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  No known exposure to asbestos, silica, beryllium or inhaled metals.  Has a [employer]     FAMILY HISTORY  Family History   Problem Relation Name Age of Onset    Alcohol abuse Mother       Maternal grandpa- lung cancer  Mater grandmother- emphysema  Father - emphysema  Mother - emphysema      REVIEW OF SYSTEMS     REVIEW OF SYSTEMS  Review of Systems    Constitutional: No fever, no chills, no night sweats.    Eyes: No double vision, no floaters, no dry eyes.   ENT: See HPI.   Neck: No neck stiffness.  Cardiovascular: No sharp chest pain, no heart racing, no leg swelling.  Respiratory: as noted in HPI.   Gastrointestinal: No nausea, no vomiting, no diarrhea.   Musculoskeletal: No joint pain, no back pain.   Integumentary: No rashes or sores.  Neurological: No dizziness, no headaches. Sleeping well.  Psychiatric: No mood changes.   Endocrine: No hot flashes, no cold intolerance, weight is stable.  Hematologic: No easy bruising or bleeding.    PHYSICAL EXAM     VITAL SIGNS:   There were no vitals filed for this visit.         CURRENT WEIGHT: There is no height or weight on file to calculate BMI.    PREVIOUS WEIGHTS:  Wt Readings from Last 3 Encounters:   02/02/24 65.8 kg (145 lb)   12/08/23 65.5 kg (144 lb 6.4 oz)   10/30/23 62.6 kg (138 lb)       Physical Exam    Constitutional: General appearance: Alert and oriented.  No acute distress. Well developed, well nourished.  Head and face: Symmetric  ENT: external inspection of ear and nose normal. No intranasal polyps. No oropharyngeal exudates.    Oropharynx: normal   Neck: supple, no lymphadenopathy  Pulmonary: Chest is normal. No increased work of breathing or signs of respiratory distress. Clear to auscultation bilaterally - no crackles, wheezing,  "or rhonchi.   Cardiovascular: Heart rate and rhythm normal. Normal S1, S2 - no murmurs, gallops, or pericardial rub.   Abdomen: Soft, non tender, +BS  Extremities: No edema. No clubbing or cyanosis of the fingernails.    Neurologic: Moves all four extremities   MSK: Normal movements of extremities. Gait normal   Psychiatric: Intact judgement and insight.    RESULTS/DATA     Pulmonary Function Test Results     No testing done    Chest Radiograph     XR chest 2 view     Narrative  Interpreted By:  TRAMAINE JACKSON MD  MRN: [id number]  Patient Name: KENIA MEDEIROS    STUDY:   CHEST 2 VIEW PA AND LAT;  4/12/2023 7:20 pm    INDICATION:  Fall onto left arm, pain in entire arm. Left rib pain. .    COMPARISON:  None.    ACCESSION NUMBER(S):  [id number]    ORDERING CLINICIAN:  MILAD SHEIKH    FINDINGS:  The cardiac silhouette is normal in size. No focal airspace  consolidation or pleural effusion.  No pneumothorax.    Impression  No airspace consolidation or pleural effusion.      Chest CT Scan     No testing done    Echocardiogram     No testing done       Labwork   Complete Blood Count  Lab Results   Component Value Date    WBC 5.1 08/21/2023    HGB 15.0 08/21/2023    HCT 45.7 08/21/2023    MCV 97 08/21/2023     08/21/2023       Peripheral Eosinophil Count/Percentage:   Eosinophils Absolute (x10E9/L)   Date Value   08/21/2023 0.06     Eosinophils % (%)   Date Value   08/21/2023 1.2       Serum Immunoglobulin E:    No results found for: \"IGE\"     Alpha 1 Anti-trypsin genotype: M/M  A1AT serum: normal -131      ASSESSMENT/PLAN   Mr. Medeiros is a 38 y.o. male, who is a current smoker (13 pack years), who presents to a [facility] for an initial evaluation for emphysema.    Problem List and Orders  Problem List Items Addressed This Visit    None    Assessment and Plan / Recommendations:  Problem List Items Addressed This Visit    None     Emphysema/ Found on CT scan of neck  - get Alpha 1 " antitrypsin genotype in office today  - get alpha 1 serum testing today  - START Stiolto 2 puffs twice daily  - get PFTs before next visit    # Smoking cessation: Patient is currently smoking - 1/2/ pack/day  - >5 minutes smoking cessation counseling   - offered pharmacologic options to assist with quitting - ordered Nicotine patch system    I strongly recommend that you quit smoking. I recommend working with our smoking cessation clinic [phone] as this has been shown to help people quit.   You can also call the [place] [facility] hotline at 3-550-QUIT-NOW.     Follow up in 4 weeks or sooner if needed.    If you have any questions please call the office [phone]    Thank you for visiting the Pulmonary clinic today!   Kacey Roca CNP  [phone]

## 2024-02-28 ENCOUNTER — TELEPHONE (OUTPATIENT)
Dept: PULMONOLOGY | Facility: CLINIC | Age: 39
End: 2024-02-28
Payer: COMMERCIAL

## 2024-02-28 NOTE — TELEPHONE ENCOUNTER
Patient has appoinment with Kacey Roca on 3/1/2024. Needs to reschedule after he has his PFT done on 3/5/2024. Left message for patient to call back.

## 2024-03-01 ENCOUNTER — APPOINTMENT (OUTPATIENT)
Dept: PULMONOLOGY | Facility: CLINIC | Age: 39
End: 2024-03-01
Payer: COMMERCIAL

## 2024-03-05 ENCOUNTER — HOSPITAL ENCOUNTER (OUTPATIENT)
Dept: RESPIRATORY THERAPY | Facility: HOSPITAL | Age: 39
Discharge: HOME | End: 2024-03-05
Payer: COMMERCIAL

## 2024-03-05 DIAGNOSIS — J43.9 PULMONARY EMPHYSEMA, UNSPECIFIED EMPHYSEMA TYPE (MULTI): ICD-10-CM

## 2024-03-05 PROCEDURE — 94726 PLETHYSMOGRAPHY LUNG VOLUMES: CPT

## 2024-03-07 NOTE — PROGRESS NOTES
Patient: Rufino Medeiros    [id number]  : 1985 -- AGE 38 y.o.    Provider: Kacey TOSCANO [facility]   Service Date: 3/14/24           [facility]  New Visit Note    HISTORY OF PRESENT ILLNESS     The patient's referring provider is: No ref. provider found    HISTORY OF PRESENT ILLNESS   Rufino Medeiros is a 38 y.o. male who is a current smoker (13 pack years), who presents to a [facility] for an initial evaluation for emphysema.     I have independently interviewed and examined the patient in the office and reviewed available records.    Current History    24: On today's visit, the patient reports getting a CT of his neck done where he was found to have emphysema on CT scan.  He was started on Spiriva once a day.  He reports coughing up a lot of weight to dark green mucus.  Reports wheezing, dyspnea on exertion, occasional chest tightness wakes up throughout the night coughing. He is currently smoking half a pack of cigarettes a day for since he was 12 years old (26 years). He also smokes marijuana daily and drinks a 6 pack of beer daily. He reports interest in quitting smoking.    Previous pulmonary history: He has no history of recurrent infections, or lung disease as a child.  He had no previous lung hx, never on oxygen or inhaler therapy.     Inhalers/nebulized medications: Spiriva    Hospitalization History: He has not been hospitalized over the last year for breathing related problem.    Sleep history: Denies snoring, apnea, feeling tired during the day or taking naps during the day.       ALLERGIES AND MEDICATIONS     ALLERGIES  Allergies   Allergen Reactions    Bee Venom Protein (Honey Bee) Hives    Diazepam Hives and Rash       MEDICATIONS  Current Outpatient Medications   Medication Sig Dispense Refill    acetaminophen (Tylenol Extra Strength) 500 mg tablet Take 2 tablets (1,000 mg) by  Referred patient out with summary. mouth if needed.      albuterol (Ventolin HFA) 90 mcg/actuation inhaler Inhale 2 puffs every 6 hours if needed for wheezing. 18 g 3    baclofen (Lioresal) 10 mg tablet Take 1 tablet (10 mg) by mouth 3 times a day as needed for muscle spasms. 180 tablet 1    cyanocobalamin, vitamin B-12, (VITAMIN B-12 ORAL) Take by mouth.      lidocaine (Lidoderm) 5 % patch Place 1 patch on the skin once daily.      meloxicam (Mobic) 15 mg tablet Take 1 tablet (15 mg) by mouth once daily.      methylPREDNISolone (Medrol Dospak) 4 mg tablets Follow schedule on package instructions (Patient not taking: Reported on 12/8/2023) 21 tablet 0    naltrexone (Depade) 50 mg tablet Take 1 tablet (50 mg) by mouth once daily. (Patient not taking: Reported on 2/2/2024) 10 tablet 0    nicotine (Nicoderm CQ) 14 mg/24 hr patch Place 1 patch over 24 hours on the skin once every 24 hours. 30 patch 0    nicotine (Nicoderm CQ) 21 mg/24 hr patch Place 1 patch over 24 hours on the skin once every 24 hours. 30 patch 0    nicotine (Nicoderm CQ) 7 mg/24 hr patch Place 1 patch over 24 hours on the skin once every 24 hours. 30 patch 0    omeprazole (PriLOSEC) 40 mg DR capsule Take 1 capsule (40 mg) by mouth once daily. 90 capsule 1    tiotropium (Spiriva) 18 mcg inhalation capsule Place 1 capsule (18 mcg) into inhaler and inhale once daily. 30 capsule 2    tiotropium-olodateroL (Stiolto Respimat) 2.5-2.5 mcg/actuation mist inhaler Inhale 2 Inhalations once daily. 4 g 3     No current facility-administered medications for this visit.         PAST HISTORY     PAST MEDICAL HISTORY  Emphysema      PAST SURGICAL HISTORY  Past Surgical History:   Procedure Laterality Date    EYE SURGERY  1989    FINGER SURGERY  2023    WISDOM TOOTH EXTRACTION  2022       IMMUNIZATION HISTORY  Immunization History   Administered Date(s) Administered    Tdap vaccine, age 7 year and older (BOOSTRIX, ADACEL) 07/01/2020       SOCIAL HISTORY  Tobacco Smoking: 12 y/o - current - 1/2  pack/day.  Second hand smoke exposure as a child.  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  No known exposure to asbestos, silica, beryllium or inhaled metals.  Has a Macaw     FAMILY HISTORY  Family History   Problem Relation Name Age of Onset    Alcohol abuse Mother       Maternal grandpa- lung cancer  Mater grandmother- emphysema  Father - emphysema  Mother - emphysema      REVIEW OF SYSTEMS     REVIEW OF SYSTEMS  Review of Systems    Constitutional: No fever, no chills, no night sweats.    Eyes: No double vision, no floaters, no dry eyes.   ENT: See HPI.   Neck: No neck stiffness.  Cardiovascular: No sharp chest pain, no heart racing, no leg swelling.  Respiratory: as noted in HPI.   Gastrointestinal: No nausea, no vomiting, no diarrhea.   Musculoskeletal: No joint pain, no back pain.   Integumentary: No rashes or sores.  Neurological: No dizziness, no headaches. Sleeping well.  Psychiatric: No mood changes.   Endocrine: No hot flashes, no cold intolerance, weight is stable.  Hematologic: No easy bruising or bleeding.    PHYSICAL EXAM     VITAL SIGNS:   There were no vitals filed for this visit.         CURRENT WEIGHT: There is no height or weight on file to calculate BMI.    PREVIOUS WEIGHTS:  Wt Readings from Last 3 Encounters:   02/02/24 65.8 kg (145 lb)   12/08/23 65.5 kg (144 lb 6.4 oz)   10/30/23 62.6 kg (138 lb)       Physical Exam    Constitutional: General appearance: Alert and oriented.  No acute distress. Well developed, well nourished.  Head and face: Symmetric  ENT: external inspection of ear and nose normal. No intranasal polyps. No oropharyngeal exudates.    Oropharynx: normal   Neck: supple, no lymphadenopathy  Pulmonary: Chest is normal. No increased work of breathing or signs of respiratory distress. Clear to auscultation bilaterally - no crackles,  "wheezing, or rhonchi.   Cardiovascular: Heart rate and rhythm normal. Normal S1, S2 - no murmurs, gallops, or pericardial rub.   Abdomen: Soft, non tender, +BS  Extremities: No edema. No clubbing or cyanosis of the fingernails.    Neurologic: Moves all four extremities   MSK: Normal movements of extremities. Gait normal   Psychiatric: Intact judgement and insight.    RESULTS/DATA     Pulmonary Function Test Results     3/5/24       Media Information        Document Information    Procedure: Pulmonary Function Test      03/05/2024 09:53   Attached To:   COMPLETE PULMONARY FUNCTION TEST PRE/POST BRONCHODIALATOR (SPIROMETRY PRE/POST/DLCO/LUNG VOLUMES) [[id number]]   Hospital Encounter on 3/5/24 with PAR RESPTHER1 PFT ROOM   Source Information    Scanning, Generic Provider       Chest Radiograph     XR chest 2 view     Narrative  Interpreted By:  TRAMAINE JACKSON MD  MRN: [id number]  Patient Name: KENIA MEDEIROS    STUDY:  TH CHEST 2 VIEW PA AND LAT;  4/12/2023 7:20 pm    INDICATION:  Fall onto left arm, pain in entire arm. Left rib pain. .    COMPARISON:  None.    ACCESSION NUMBER(S):  [id number]    ORDERING CLINICIAN:  MILAD SHEIKH    FINDINGS:  The cardiac silhouette is normal in size. No focal airspace  consolidation or pleural effusion.  No pneumothorax.    Impression  No airspace consolidation or pleural effusion.      Chest CT Scan     No testing done    Echocardiogram     No testing done       Labwork   Complete Blood Count  Lab Results   Component Value Date    WBC 5.1 08/21/2023    HGB 15.0 08/21/2023    HCT 45.7 08/21/2023    MCV 97 08/21/2023     08/21/2023       Peripheral Eosinophil Count/Percentage:   Eosinophils Absolute (x10E9/L)   Date Value   08/21/2023 0.06     Eosinophils % (%)   Date Value   08/21/2023 1.2       Serum Immunoglobulin E:    No results found for: \"IGE\"     A1AT genotype: M/M    ASSESSMENT/PLAN   Mr. Medeiros is a 38 y.o. male, who is a current smoker (13 pack years), who presents to a " [facility] for an initial evaluation for emphysema.  PFTs: Normal  A1AT: M/M    Problem List and Orders  Problem List Items Addressed This Visit    None    Assessment and Plan / Recommendations:  Problem List Items Addressed This Visit    None     Emphysema/ Found on CT scan of neck  - get Alpha 1 antitrypsin genotype in office today  - get alpha 1 serum testing today  - START Stiolto 2 puffs twice daily  - get PFTs before next visit    # Smoking cessation: Patient is currently smoking - 1/2/ pack/day  - >5 minutes smoking cessation counseling   - offered pharmacologic options to assist with quitting - ordered Nicotine patch system    I strongly recommend that you quit smoking. I recommend working with our smoking cessation clinic [phone] as this has been shown to help people quit.   You can also call the [employer] hotline at 0-247-QUIT-NOW.     Follow up in 4 weeks or sooner if needed.    If you have any questions please call the office [phone]    Thank you for visiting the Pulmonary clinic today!   Kacey Roca CNP  [phone]

## 2024-03-08 LAB
MGC ASCENT PFT - FEV1 - PRE: 4.08
MGC ASCENT PFT - FEV1 - PREDICTED: 3.94
MGC ASCENT PFT - FVC - PRE: 5.38
MGC ASCENT PFT - FVC - PREDICTED: 4.79

## 2024-03-14 ENCOUNTER — APPOINTMENT (OUTPATIENT)
Dept: PULMONOLOGY | Facility: CLINIC | Age: 39
End: 2024-03-14
Payer: COMMERCIAL

## 2024-08-09 ENCOUNTER — APPOINTMENT (OUTPATIENT)
Dept: RADIOLOGY | Facility: HOSPITAL | Age: 39
End: 2024-08-09
Payer: COMMERCIAL

## 2024-08-09 ENCOUNTER — HOSPITAL ENCOUNTER (OUTPATIENT)
Dept: CARDIOLOGY | Facility: HOSPITAL | Age: 39
Discharge: HOME | End: 2024-08-09
Payer: COMMERCIAL

## 2024-08-09 ENCOUNTER — HOSPITAL ENCOUNTER (EMERGENCY)
Facility: HOSPITAL | Age: 39
Discharge: HOME | End: 2024-08-10
Attending: EMERGENCY MEDICINE
Payer: COMMERCIAL

## 2024-08-09 VITALS
HEIGHT: 69 IN | BODY MASS INDEX: 20.44 KG/M2 | SYSTOLIC BLOOD PRESSURE: 124 MMHG | DIASTOLIC BLOOD PRESSURE: 90 MMHG | TEMPERATURE: 96.8 F | OXYGEN SATURATION: 100 % | HEART RATE: 87 BPM | WEIGHT: 138 LBS | RESPIRATION RATE: 20 BRPM

## 2024-08-09 DIAGNOSIS — K52.9 ACUTE COLITIS: Primary | ICD-10-CM

## 2024-08-09 DIAGNOSIS — E83.42 HYPOMAGNESEMIA: ICD-10-CM

## 2024-08-09 DIAGNOSIS — R19.7 DIARRHEA, UNSPECIFIED TYPE: ICD-10-CM

## 2024-08-09 DIAGNOSIS — E87.6 HYPOKALEMIA: ICD-10-CM

## 2024-08-09 LAB
ALBUMIN SERPL BCP-MCNC: 4 G/DL (ref 3.4–5)
ALP SERPL-CCNC: 59 U/L (ref 33–120)
ALT SERPL W P-5'-P-CCNC: 15 U/L (ref 10–52)
ANION GAP SERPL CALC-SCNC: 12 MMOL/L (ref 10–20)
APPEARANCE UR: CLEAR
AST SERPL W P-5'-P-CCNC: 16 U/L (ref 9–39)
BASOPHILS # BLD AUTO: 0.02 X10*3/UL (ref 0–0.1)
BASOPHILS NFR BLD AUTO: 0.3 %
BILIRUB SERPL-MCNC: 0.3 MG/DL (ref 0–1.2)
BILIRUB UR STRIP.AUTO-MCNC: NEGATIVE MG/DL
BUN SERPL-MCNC: 4 MG/DL (ref 6–23)
CALCIUM SERPL-MCNC: 9 MG/DL (ref 8.6–10.3)
CARDIAC TROPONIN I PNL SERPL HS: <3 NG/L (ref 0–20)
CARDIAC TROPONIN I PNL SERPL HS: <3 NG/L (ref 0–20)
CHLORIDE SERPL-SCNC: 99 MMOL/L (ref 98–107)
CO2 SERPL-SCNC: 33 MMOL/L (ref 21–32)
COLOR UR: YELLOW
CREAT SERPL-MCNC: 0.97 MG/DL (ref 0.5–1.3)
EGFRCR SERPLBLD CKD-EPI 2021: >90 ML/MIN/1.73M*2
EOSINOPHIL # BLD AUTO: 0.04 X10*3/UL (ref 0–0.7)
EOSINOPHIL NFR BLD AUTO: 0.6 %
ERYTHROCYTE [DISTWIDTH] IN BLOOD BY AUTOMATED COUNT: 12 % (ref 11.5–14.5)
FLUAV RNA RESP QL NAA+PROBE: NOT DETECTED
FLUBV RNA RESP QL NAA+PROBE: NOT DETECTED
GLUCOSE SERPL-MCNC: 125 MG/DL (ref 74–99)
GLUCOSE UR STRIP.AUTO-MCNC: NORMAL MG/DL
HCT VFR BLD AUTO: 37.1 % (ref 41–52)
HGB BLD-MCNC: 13.1 G/DL (ref 13.5–17.5)
IMM GRANULOCYTES # BLD AUTO: 0.02 X10*3/UL (ref 0–0.7)
IMM GRANULOCYTES NFR BLD AUTO: 0.3 % (ref 0–0.9)
KETONES UR STRIP.AUTO-MCNC: NEGATIVE MG/DL
LACTATE SERPL-SCNC: 0.7 MMOL/L (ref 0.4–2)
LACTATE SERPL-SCNC: 2.4 MMOL/L (ref 0.4–2)
LEUKOCYTE ESTERASE UR QL STRIP.AUTO: NEGATIVE
LYMPHOCYTES # BLD AUTO: 1.51 X10*3/UL (ref 1.2–4.8)
LYMPHOCYTES NFR BLD AUTO: 23.5 %
MAGNESIUM SERPL-MCNC: 1.41 MG/DL (ref 1.6–2.4)
MCH RBC QN AUTO: 33.5 PG (ref 26–34)
MCHC RBC AUTO-ENTMCNC: 35.3 G/DL (ref 32–36)
MCV RBC AUTO: 95 FL (ref 80–100)
MONOCYTES # BLD AUTO: 0.76 X10*3/UL (ref 0.1–1)
MONOCYTES NFR BLD AUTO: 11.8 %
MUCOUS THREADS #/AREA URNS AUTO: NORMAL /LPF
NEUTROPHILS # BLD AUTO: 4.07 X10*3/UL (ref 1.2–7.7)
NEUTROPHILS NFR BLD AUTO: 63.5 %
NITRITE UR QL STRIP.AUTO: NEGATIVE
NRBC BLD-RTO: 0 /100 WBCS (ref 0–0)
PH UR STRIP.AUTO: 6 [PH]
PLATELET # BLD AUTO: 184 X10*3/UL (ref 150–450)
POTASSIUM SERPL-SCNC: 3.3 MMOL/L (ref 3.5–5.3)
PROT SERPL-MCNC: 6.8 G/DL (ref 6.4–8.2)
PROT UR STRIP.AUTO-MCNC: NORMAL MG/DL
RBC # BLD AUTO: 3.91 X10*6/UL (ref 4.5–5.9)
RBC # UR STRIP.AUTO: NEGATIVE /UL
RBC #/AREA URNS AUTO: NORMAL /HPF
SARS-COV-2 RNA RESP QL NAA+PROBE: NOT DETECTED
SODIUM SERPL-SCNC: 141 MMOL/L (ref 136–145)
SP GR UR STRIP.AUTO: 1.02
UROBILINOGEN UR STRIP.AUTO-MCNC: NORMAL MG/DL
WBC # BLD AUTO: 6.4 X10*3/UL (ref 4.4–11.3)
WBC #/AREA URNS AUTO: NORMAL /HPF

## 2024-08-09 PROCEDURE — 81001 URINALYSIS AUTO W/SCOPE: CPT | Performed by: NURSE PRACTITIONER

## 2024-08-09 PROCEDURE — 84484 ASSAY OF TROPONIN QUANT: CPT | Performed by: NURSE PRACTITIONER

## 2024-08-09 PROCEDURE — 80053 COMPREHEN METABOLIC PANEL: CPT | Performed by: NURSE PRACTITIONER

## 2024-08-09 PROCEDURE — 71046 X-RAY EXAM CHEST 2 VIEWS: CPT

## 2024-08-09 PROCEDURE — 87506 IADNA-DNA/RNA PROBE TQ 6-11: CPT | Mod: PARLAB | Performed by: NURSE PRACTITIONER

## 2024-08-09 PROCEDURE — 74177 CT ABD & PELVIS W/CONTRAST: CPT | Performed by: STUDENT IN AN ORGANIZED HEALTH CARE EDUCATION/TRAINING PROGRAM

## 2024-08-09 PROCEDURE — 71046 X-RAY EXAM CHEST 2 VIEWS: CPT | Performed by: RADIOLOGY

## 2024-08-09 PROCEDURE — 99284 EMERGENCY DEPT VISIT MOD MDM: CPT | Mod: 25

## 2024-08-09 PROCEDURE — 87328 CRYPTOSPORIDIUM AG IA: CPT | Performed by: NURSE PRACTITIONER

## 2024-08-09 PROCEDURE — 83735 ASSAY OF MAGNESIUM: CPT | Performed by: NURSE PRACTITIONER

## 2024-08-09 PROCEDURE — 96361 HYDRATE IV INFUSION ADD-ON: CPT

## 2024-08-09 PROCEDURE — 87329 GIARDIA AG IA: CPT | Performed by: NURSE PRACTITIONER

## 2024-08-09 PROCEDURE — 87636 SARSCOV2 & INF A&B AMP PRB: CPT | Performed by: NURSE PRACTITIONER

## 2024-08-09 PROCEDURE — 2500000004 HC RX 250 GENERAL PHARMACY W/ HCPCS (ALT 636 FOR OP/ED): Performed by: EMERGENCY MEDICINE

## 2024-08-09 PROCEDURE — 85025 COMPLETE CBC W/AUTO DIFF WBC: CPT | Performed by: NURSE PRACTITIONER

## 2024-08-09 PROCEDURE — 93005 ELECTROCARDIOGRAM TRACING: CPT

## 2024-08-09 PROCEDURE — 2550000001 HC RX 255 CONTRASTS: Performed by: EMERGENCY MEDICINE

## 2024-08-09 PROCEDURE — 74177 CT ABD & PELVIS W/CONTRAST: CPT

## 2024-08-09 PROCEDURE — 96365 THER/PROPH/DIAG IV INF INIT: CPT

## 2024-08-09 PROCEDURE — 96375 TX/PRO/DX INJ NEW DRUG ADDON: CPT

## 2024-08-09 PROCEDURE — 83605 ASSAY OF LACTIC ACID: CPT | Performed by: NURSE PRACTITIONER

## 2024-08-09 PROCEDURE — 36415 COLL VENOUS BLD VENIPUNCTURE: CPT | Performed by: NURSE PRACTITIONER

## 2024-08-09 RX ORDER — POTASSIUM CHLORIDE 1.5 G/1.58G
40 POWDER, FOR SOLUTION ORAL ONCE
Status: COMPLETED | OUTPATIENT
Start: 2024-08-09 | End: 2024-08-10

## 2024-08-09 RX ORDER — KETOROLAC TROMETHAMINE 30 MG/ML
30 INJECTION, SOLUTION INTRAMUSCULAR; INTRAVENOUS ONCE
Status: COMPLETED | OUTPATIENT
Start: 2024-08-09 | End: 2024-08-09

## 2024-08-09 RX ORDER — LANOLIN ALCOHOL/MO/W.PET/CERES
400 CREAM (GRAM) TOPICAL ONCE
Status: COMPLETED | OUTPATIENT
Start: 2024-08-09 | End: 2024-08-10

## 2024-08-09 RX ADMIN — SODIUM CHLORIDE, POTASSIUM CHLORIDE, SODIUM LACTATE AND CALCIUM CHLORIDE 1000 ML: 600; 310; 30; 20 INJECTION, SOLUTION INTRAVENOUS at 22:31

## 2024-08-09 RX ADMIN — SODIUM CHLORIDE 1000 ML: 9 INJECTION, SOLUTION INTRAVENOUS at 18:35

## 2024-08-09 RX ADMIN — IOHEXOL 75 ML: 350 INJECTION, SOLUTION INTRAVENOUS at 18:20

## 2024-08-09 RX ADMIN — PIPERACILLIN SODIUM AND TAZOBACTAM SODIUM 4.5 G: 4; .5 INJECTION, SOLUTION INTRAVENOUS at 21:30

## 2024-08-09 RX ADMIN — KETOROLAC TROMETHAMINE 30 MG: 30 INJECTION, SOLUTION INTRAMUSCULAR at 21:30

## 2024-08-09 ASSESSMENT — COLUMBIA-SUICIDE SEVERITY RATING SCALE - C-SSRS
1. IN THE PAST MONTH, HAVE YOU WISHED YOU WERE DEAD OR WISHED YOU COULD GO TO SLEEP AND NOT WAKE UP?: NO
6. HAVE YOU EVER DONE ANYTHING, STARTED TO DO ANYTHING, OR PREPARED TO DO ANYTHING TO END YOUR LIFE?: NO
2. HAVE YOU ACTUALLY HAD ANY THOUGHTS OF KILLING YOURSELF?: NO

## 2024-08-09 ASSESSMENT — PAIN SCALES - GENERAL: PAINLEVEL_OUTOF10: 9

## 2024-08-09 ASSESSMENT — PAIN - FUNCTIONAL ASSESSMENT: PAIN_FUNCTIONAL_ASSESSMENT: 0-10

## 2024-08-09 NOTE — ED TRIAGE NOTES
This patient was seen and examined in triage    HPI:  Patient is a healthy nontoxic-appearing 38-year-old male with past medical history of squamous cell carcinoma, alcohol use, STD, chronic pain, osteomyelitis, anxiety, esophageal reflux, presents to the emergency room today for multiple medical complaints.  Patient states he has been having diffuse abdominal pain and diarrhea over the past 4 days.  Patient states stools been consistency of black paste.  Patient denies any dietary changes or Pepto-Bismol use.  Patient states he has developed right-sided chest discomfort that radiates into the side of the chest no alleviating or worsening factors and has been constant.  Patient complains of shortness of breath is worse with exertion and improves with rest.  Patient complains of nausea without any vomiting.  Patient denies any cough or congestion.  Patient does complain increased fatigue, generalized malaise.  Patient denies any urinary symptoms, fever, shaking, or chills.    Focused PE:  Gen: Well-appearing, not in acute distress  Cardiovascular: Regular rate, normal rhythm, no murmur, no gallop  Respiratory: No adventitious lung sounds auscultated.  Abdomen: Diffuse abdominal pain with light palpation, physical exam may be limited by patient positioning sitting up in a chair  Neuro:  Alert and Oriented, speech clear and coherent    Plan:  Lab work ordered from triage including EKG, chest x-ray, CT abdomen and pelvis.    For the remainder the patient's work-up and ED course, please see the main ED provider note.  We discussed need for diagnostic testing including lab studies and imaging.  We also discussed that they may be asked to wait in the waiting room while these test are pending.  They understand that if they choose to leave without having the testing completed or resulted that we cannot rule out acute life-threatening illnesses and the risks involved to lead to worsening condition, permanent disability or  even death.

## 2024-08-10 LAB
C COLI+JEJ+UPSA DNA STL QL NAA+PROBE: DETECTED
EC STX1 GENE STL QL NAA+PROBE: NOT DETECTED
EC STX2 GENE STL QL NAA+PROBE: NOT DETECTED
NOROVIRUS GI + GII RNA STL NAA+PROBE: NOT DETECTED
RV RNA STL NAA+PROBE: NOT DETECTED
SALMONELLA DNA STL QL NAA+PROBE: NOT DETECTED
SHIGELLA DNA SPEC QL NAA+PROBE: NOT DETECTED
V CHOLERAE DNA STL QL NAA+PROBE: NOT DETECTED
Y ENTEROCOL DNA STL QL NAA+PROBE: NOT DETECTED

## 2024-08-10 PROCEDURE — 2500000004 HC RX 250 GENERAL PHARMACY W/ HCPCS (ALT 636 FOR OP/ED): Performed by: EMERGENCY MEDICINE

## 2024-08-10 PROCEDURE — 2500000001 HC RX 250 WO HCPCS SELF ADMINISTERED DRUGS (ALT 637 FOR MEDICARE OP): Performed by: EMERGENCY MEDICINE

## 2024-08-10 RX ORDER — METRONIDAZOLE 500 MG/1
500 TABLET ORAL 3 TIMES DAILY
Qty: 21 TABLET | Refills: 0 | Status: SHIPPED | OUTPATIENT
Start: 2024-08-10 | End: 2024-08-17

## 2024-08-10 RX ORDER — DICYCLOMINE HYDROCHLORIDE 20 MG/1
20 TABLET ORAL 2 TIMES DAILY
Qty: 20 TABLET | Refills: 0 | Status: SHIPPED | OUTPATIENT
Start: 2024-08-10 | End: 2024-08-20

## 2024-08-10 RX ORDER — CIPROFLOXACIN 500 MG/1
500 TABLET ORAL 2 TIMES DAILY
Qty: 14 TABLET | Refills: 0 | Status: SHIPPED | OUTPATIENT
Start: 2024-08-10 | End: 2024-08-17

## 2024-08-10 RX ORDER — ONDANSETRON 4 MG/1
4 TABLET, ORALLY DISINTEGRATING ORAL EVERY 8 HOURS PRN
Qty: 15 TABLET | Refills: 0 | Status: SHIPPED | OUTPATIENT
Start: 2024-08-10

## 2024-08-10 RX ADMIN — POTASSIUM CHLORIDE 40 MEQ: 1.5 POWDER, FOR SOLUTION ORAL at 00:11

## 2024-08-10 RX ADMIN — MAGNESIUM OXIDE TAB 400 MG (241.3 MG ELEMENTAL MG) 400 MG: 400 (241.3 MG) TAB at 00:10

## 2024-08-10 NOTE — ED PROVIDER NOTES
HPI   Chief Complaint   Patient presents with    Abdominal Pain    Diarrhea       HPI: []  38-year-old white male with no medical history for fibromyalgia, scum cell carcinoma of the finger Kim comes in with the diarrhea abdominal pain for the last few days.  He has nausea but no vomiting.  Diarrhea is profuse.  It is watery.  No bloody stools.  About 20+ bowel movements daily.  He has cramping.  He denies any recent travel hospitalization or antibiotic use.  He denies hematemesis melena hematochezia denies hemoptysis denies night sweats no sick contacts.    Past history: Squamous cell carcinoma, fibromyalgia  Social: Denies tobacco drinks alcohol denies drug abuse.  REVIEW OF SYSTEMS:    GENERAL.: No weight loss, fatigue, anorexia, insomnia, fever.    EYES: No vision loss, double vision, drainage, eye pain.    ENT: No pharyngitis, dry mouth.    CARDIOPULMONARY: No chest pain, palpitations, syncope, near syncope. No shortness of breath, cough, hemoptysis.    GI: Positive for abdominal pain, change in bowel habits, melena, hematemesis, hematochezia, nausea, vomiting, positive for diarrhea.    : No discharge, dysuria, frequency, urgency, hematuria.    MS: No limb pain, joint pain, joint swelling.    SKIN: No rashes.    PSYCH: No depression, anxiety, suicidality, homicidality.    Review of systems is otherwise negative unless stated above or in history of present illness.  Social history, family history, allergies reviewed.  PHYSICAL EXAM:    GENERAL: Vitals noted, no distress. Alert and oriented  x 3. Non-toxic.      EENT: TMs clear. Posterior oropharynx unremarkable. No meningismus. No LAD.     NECK: Supple. Nontender. No midline tenderness.     CARDIAC: Regular, rate, rhythm. No murmurs rubs or gallops. No JVD    PULMONARY: Lungs clear bilaterally with good aeration. No wheezes rales or rhonchi. No respiratory distress.     ABDOMEN: Soft, nonsurgical.  Tender diffusely no rebound or guarding negative Peña sign  negative McBurney point tenderness no CVA tenderness. No peritoneal signs. Normoactive bowel sounds. No pulsatile masses.  Negative inguinal hernias.    EXTREMITIES: No peripheral edema. Negative Homans bilaterally, no cords.  2+ bounding pulses well-perfused.    SKIN: No rash. Intact.     NEURO: No focal neurologic deficits, NIH score of 0. Cranial nerves normal as tested from II through XII.     MEDICAL DECISION MAKING:  CBC shows no leukocytosis stable hemoglobin chemistries low potassium 3.3 magnesium low 1.4 lactate is normal abdominal CAT scan shows acute colitis.  Stool PCR pending.  EKG on my interpretation shows a normal sinus rhythm normal axis rate in the mid 80s with no acute ischemic changes.  Treatment injury: IV established given 2 L of IV fluids oral potassium oral magnesium IV Zosyn    ED course: Repeat assessment feeling much better remains afebrile normotensive no tachycardia hypoxia.  Was to go home.  Passed a p.o. challenge.    Impression: #1 acute colitis/enteritis  Plan/MDM: 38-year-old male comes in with diarrhea and abdominal discomfort 5 days duration.  Workup is concerning for acute colitis/enteritis could be infectious he has no leukocytosis no fever normal lactate and no evidence of toxic megacolon, will be discharged home with oral Cipro and Flagyl bland diet Bentyl as needed close outpatient follow-up with strict return precaution.  Low concern for bacteremia sepsis or toxic megacolon or bowel perforation.  Or bowel ischemia.              Patient History   Past Medical History:   Diagnosis Date    Cancer (Multi) 04/2023    Fibromyalgia     Headache      Past Surgical History:   Procedure Laterality Date    EYE SURGERY  1989    FINGER SURGERY  2023    WISDOM TOOTH EXTRACTION  2022     Family History   Problem Relation Name Age of Onset    Alcohol abuse Mother       Social History     Tobacco Use    Smoking status: Every Day     Current packs/day: 0.50     Average packs/day: 0.5  "packs/day for 20.0 years (10.0 ttl pk-yrs)     Types: Cigarettes    Smokeless tobacco: Never   Vaping Use    Vaping status: Never Used   Substance Use Topics    Alcohol use: Yes     Alcohol/week: 30.0 standard drinks of alcohol     Types: 30 Cans of beer per week     Comment: EVERY OTHER DAY -CASUAL    Drug use: Yes     Types: Marijuana       Physical Exam   ED Triage Vitals [08/09/24 1234]   Temperature Heart Rate Respirations BP   36 °C (96.8 °F) 87 20 124/69      Pulse Ox Temp Source Heart Rate Source Patient Position   100 % Skin Monitor Sitting      BP Location FiO2 (%)     Right arm --       Physical Exam      ED Course & MDM   ED Course as of 08/09/24 2352   Fri Aug 09, 2024   2347 Patient CBC shows no leukocytosis, chemistry is low potassium 3.3 low magnesium 1.4 which were replaced, troponin x 2 is negative, UA negative, abdominal CAT scan shows a \"acute colitis, stool PCR pending, patient was given 2 L of fluids intravenous ketorolac, and IV Zosyn.  On repeat eval feeling much better wants to go home will be discharged home with Cipro and Flagyl for 7 days, bland diet Bentyl to be taken as needed, close outpatient follow-up recommended with strict return precaution. [MT]      ED Course User Index  [MT] Alex Esqueda MD         Diagnoses as of 08/09/24 2352   Acute colitis   Diarrhea, unspecified type   Hypokalemia   Hypomagnesemia                 No data recorded     Sergio Coma Scale Score: 15 (08/09/24 2151 : Kisha Morataya RN)                           Medical Decision Making      Procedure  Procedures     Alex Esqueda MD  08/09/24 2352       Alex Esqueda MD  09/07/24 2110    "

## 2024-08-12 ENCOUNTER — TELEPHONE (OUTPATIENT)
Dept: PHARMACY | Facility: HOSPITAL | Age: 39
End: 2024-08-12
Payer: COMMERCIAL

## 2024-08-12 NOTE — PROGRESS NOTES
EDPD Note: Antibiotics Reviewed and Warranted    Contacted Mr./Mrs./Ms. Rufino Medeiros regarding a positive stool culture/result that was taken during their recent emergency room visit. Patient was positive for campylobacte0.r I completed education with patient. Informed patient of BRAT diet and to stay hydrated. The patient is being treated appropriately with Ciprofloxacin 500mg PO BID x 7 days and Metronidazole 500 mg PO TID x7 days .     No results found for the last 90 days.       No further follow up needed from EDPD Team.     ARACELY DAILEY, TaiwoD  
spouse/lives in [facility], with few steps to get in,
spouse

## 2024-08-12 NOTE — TELEPHONE ENCOUNTER
EDPD Note: Antibiotics Reviewed and Warranted    Contacted Mr./Mrs./Ms. Rufino Medeiros regarding a positive stool culture/result that was taken during their recent emergency room visit. Patient was positive for campylobacte0.r I completed education with patient. Informed patient of BRAT diet and to stay hydrated. The patient is being treated appropriately with Ciprofloxacin 500mg PO BID x 7 days and Metronidazole 500 mg PO TID x7 days .     No results found for the last 90 days.       No further follow up needed from EDPD Team.     ARACELY DAILEY, TaiwoD

## 2024-08-14 LAB
ATRIAL RATE: 86 BPM
CRYPTOSP AG STL QL IA: NEGATIVE
G LAMBLIA AG STL QL IA: NEGATIVE
P AXIS: 67 DEGREES
P OFFSET: 198 MS
P ONSET: 151 MS
PR INTERVAL: 146 MS
Q ONSET: 224 MS
QRS COUNT: 14 BEATS
QRS DURATION: 76 MS
QT INTERVAL: 382 MS
QTC CALCULATION(BAZETT): 457 MS
QTC FREDERICIA: 431 MS
R AXIS: 75 DEGREES
T AXIS: 61 DEGREES
T OFFSET: 415 MS
VENTRICULAR RATE: 86 BPM

## 2024-08-15 LAB — O+P STL MICRO: NEGATIVE

## 2024-08-19 ENCOUNTER — APPOINTMENT (OUTPATIENT)
Dept: PRIMARY CARE | Facility: CLINIC | Age: 39
End: 2024-08-19
Payer: COMMERCIAL

## 2024-08-21 ENCOUNTER — OFFICE VISIT (OUTPATIENT)
Dept: PRIMARY CARE | Facility: CLINIC | Age: 39
End: 2024-08-21
Payer: COMMERCIAL

## 2024-08-21 VITALS
HEIGHT: 69 IN | HEART RATE: 82 BPM | SYSTOLIC BLOOD PRESSURE: 109 MMHG | WEIGHT: 142.6 LBS | BODY MASS INDEX: 21.12 KG/M2 | DIASTOLIC BLOOD PRESSURE: 75 MMHG

## 2024-08-21 DIAGNOSIS — E83.42 HYPOMAGNESEMIA: ICD-10-CM

## 2024-08-21 DIAGNOSIS — E87.6 HYPOKALEMIA: ICD-10-CM

## 2024-08-21 DIAGNOSIS — E87.20 LACTIC ACID ACIDOSIS: Primary | ICD-10-CM

## 2024-08-21 DIAGNOSIS — K52.9 ACUTE COLITIS: ICD-10-CM

## 2024-08-21 DIAGNOSIS — R19.7 DIARRHEA, UNSPECIFIED TYPE: ICD-10-CM

## 2024-08-21 PROCEDURE — 3008F BODY MASS INDEX DOCD: CPT | Performed by: FAMILY MEDICINE

## 2024-08-21 PROCEDURE — 99213 OFFICE O/P EST LOW 20 MIN: CPT | Performed by: FAMILY MEDICINE

## 2024-08-21 RX ORDER — DULOXETIN HYDROCHLORIDE 20 MG/1
20 CAPSULE, DELAYED RELEASE ORAL
COMMUNITY
Start: 2024-08-15

## 2024-08-21 NOTE — PROGRESS NOTES
Subjective   Patient ID: Rufino Medeiros [id number] is a 38 y.o. male who presents for Follow-up (Follow up pain in chest comes and goes below collar bone on the right).    HPI   Pt was in ER 1 week ago 8/9 /2024 - pt had Diarrhea and stomach pain - pt found to have campylobactor Jejuni  Was treated with bentyl, cipro and flagyl.  Patient completed antibiotic.  Patient feeling better.  Denies abdominal pain nausea vomiting or diarrhea.    Pt was advised to see GI for further testing.  Patient going to follow-up with Dr. Barboza    Social History     Tobacco Use    Smoking status: Every Day     Current packs/day: 0.50     Average packs/day: 0.5 packs/day for 20.0 years (10.0 ttl pk-yrs)     Types: Cigarettes    Smokeless tobacco: Never   Vaping Use    Vaping status: Never Used   Substance Use Topics    Alcohol use: Yes     Alcohol/week: 30.0 standard drinks of alcohol     Types: 30 Cans of beer per week     Comment: EVERY OTHER DAY -CASUAL    Drug use: Yes     Types: Marijuana       Allergies   Allergen Reactions    Bee Venom Protein (Honey Bee) Hives    Diazepam Hives and Rash       Current Outpatient Medications   Medication Sig Dispense Refill    acetaminophen (Tylenol Extra Strength) 500 mg tablet Take 2 tablets (1,000 mg) by mouth if needed.      albuterol (Ventolin HFA) 90 mcg/actuation inhaler Inhale 2 puffs every 6 hours if needed for wheezing. 18 g 3    DULoxetine (Cymbalta) 20 mg DR capsule Take 1 capsule (20 mg) by mouth once daily.      omeprazole (PriLOSEC) 40 mg DR capsule Take 1 capsule (40 mg) by mouth once daily. 90 capsule 1    baclofen (Lioresal) 10 mg tablet Take 1 tablet (10 mg) by mouth 3 times a day as needed for muscle spasms. (Patient not taking: Reported on 8/21/2024) 180 tablet 1    cyanocobalamin, vitamin B-12, (VITAMIN B-12 ORAL) Take by mouth.      lidocaine (Lidoderm) 5 % patch Place 1 patch on the skin once daily.      meloxicam (Mobic) 15 mg tablet Take 1 tablet (15 mg) by mouth once  "daily.      methylPREDNISolone (Medrol Dospak) 4 mg tablets Follow schedule on package instructions (Patient not taking: Reported on 12/8/2023) 21 tablet 0    nicotine (Nicoderm CQ) 14 mg/24 hr patch Place 1 patch over 24 hours on the skin once every 24 hours. 30 patch 0    nicotine (Nicoderm CQ) 21 mg/24 hr patch Place 1 patch over 24 hours on the skin once every 24 hours. 30 patch 0    nicotine (Nicoderm CQ) 7 mg/24 hr patch Place 1 patch over 24 hours on the skin once every 24 hours. 30 patch 0    ondansetron ODT (Zofran-ODT) 4 mg disintegrating tablet Take 1 tablet (4 mg) by mouth every 8 hours if needed for nausea or vomiting. (Patient not taking: Reported on 8/21/2024) 15 tablet 0    tiotropium (Spiriva) 18 mcg inhalation capsule Place 1 capsule (18 mcg) into inhaler and inhale once daily. 30 capsule 2    tiotropium-olodateroL (Stiolto Respimat) 2.5-2.5 mcg/actuation mist inhaler Inhale 2 Inhalations once daily. (Patient not taking: Reported on 8/21/2024) 4 g 3     No current facility-administered medications for this visit.       Physical Exam  /75   Pulse 82   Ht 1.753 m (5' 9\")   Wt 64.7 kg (142 lb 9.6 oz)   BMI 21.06 kg/m²     General Appearance:  Alert, cooperative, no distress,   Head:  Normocephalic, atraumatic   Eyes:  PERRL, conjunctiva/corneas clear, EOM's intact,    Lungs:   Clear to auscultation bilaterally, respirations unlabored   Heart:  Regular rate and rhythm, S1 and S2 normal, no murmur,    Neurologic: Normal      Admission on 08/09/2024, Discharged on 08/10/2024   Component Date Value Ref Range Status    WBC 08/09/2024 6.4  4.4 - 11.3 x10*3/uL Final    nRBC 08/09/2024 0.0  0.0 - 0.0 /100 WBCs Final    RBC 08/09/2024 3.91 (L)  4.50 - 5.90 x10*6/uL Final    Hemoglobin 08/09/2024 13.1 (L)  13.5 - 17.5 g/dL Final    Hematocrit 08/09/2024 37.1 (L)  41.0 - 52.0 % Final    MCV 08/09/2024 95  80 - 100 fL Final    MCH 08/09/2024 33.5  26.0 - 34.0 pg Final    MCHC 08/09/2024 35.3  32.0 - " 36.0 g/dL Final    RDW 08/09/2024 12.0  11.5 - 14.5 % Final    Platelets 08/09/2024 184  150 - 450 x10*3/uL Final    Neutrophils % 08/09/2024 63.5  40.0 - 80.0 % Final    Immature Granulocytes %, Automated 08/09/2024 0.3  0.0 - 0.9 % Final    Immature Granulocyte Count (IG) includes promyelocytes, myelocytes and metamyelocytes but does not include bands. Percent differential counts (%) should be interpreted in the context of the absolute cell counts (cells/UL).    Lymphocytes % 08/09/2024 23.5  13.0 - 44.0 % Final    Monocytes % 08/09/2024 11.8  2.0 - 10.0 % Final    Eosinophils % 08/09/2024 0.6  0.0 - 6.0 % Final    Basophils % 08/09/2024 0.3  0.0 - 2.0 % Final    Neutrophils Absolute 08/09/2024 4.07  1.20 - 7.70 x10*3/uL Final    Percent differential counts (%) should be interpreted in the context of the absolute cell counts (cells/uL).    Immature Granulocytes Absolute, Au* 08/09/2024 0.02  0.00 - 0.70 x10*3/uL Final    Lymphocytes Absolute 08/09/2024 1.51  1.20 - 4.80 x10*3/uL Final    Monocytes Absolute 08/09/2024 0.76  0.10 - 1.00 x10*3/uL Final    Eosinophils Absolute 08/09/2024 0.04  0.00 - 0.70 x10*3/uL Final    Basophils Absolute 08/09/2024 0.02  0.00 - 0.10 x10*3/uL Final    Glucose 08/09/2024 125 (H)  74 - 99 mg/dL Final    Sodium 08/09/2024 141  136 - 145 mmol/L Final    Potassium 08/09/2024 3.3 (L)  3.5 - 5.3 mmol/L Final    Chloride 08/09/2024 99  98 - 107 mmol/L Final    Bicarbonate 08/09/2024 33 (H)  21 - 32 mmol/L Final    Anion Gap 08/09/2024 12  10 - 20 mmol/L Final    Urea Nitrogen 08/09/2024 4 (L)  6 - 23 mg/dL Final    Creatinine 08/09/2024 0.97  0.50 - 1.30 mg/dL Final    eGFR 08/09/2024 >90  >60 mL/min/1.73m*2 Final    Calculations of estimated GFR are performed using the 2021 CKD-EPI Study Refit equation without the race variable for the IDMS-Traceable creatinine methods.  https://jasn.asnjournals.org/content/early/2021/09/22/ASN.4684205424    Calcium 08/09/2024 9.0  8.6 - 10.3 mg/dL Final     Albumin 08/09/2024 4.0  3.4 - 5.0 g/dL Final    Alkaline Phosphatase 08/09/2024 59  33 - 120 U/L Final    Total Protein 08/09/2024 6.8  6.4 - 8.2 g/dL Final    AST 08/09/2024 16  9 - 39 U/L Final    Bilirubin, Total 08/09/2024 0.3  0.0 - 1.2 mg/dL Final    ALT 08/09/2024 15  10 - 52 U/L Final    Patients treated with Sulfasalazine may generate falsely decreased results for ALT.    Magnesium 08/09/2024 1.41 (L)  1.60 - 2.40 mg/dL Final    Color, Urine 08/09/2024 Yellow  Light-Yellow, Yellow, Dark-Yellow Final    Appearance, Urine 08/09/2024 Clear  Clear Final    Specific Gravity, Urine 08/09/2024 1.019  1.005 - 1.035 Final    pH, Urine 08/09/2024 6.0  5.0, 5.5, 6.0, 6.5, 7.0, 7.5, 8.0 Final    Protein, Urine 08/09/2024 20 (TRACE)  NEGATIVE, 10 (TRACE), 20 (TRACE) mg/dL Final    Glucose, Urine 08/09/2024 Normal  Normal mg/dL Final    Blood, Urine 08/09/2024 NEGATIVE  NEGATIVE Final    Ketones, Urine 08/09/2024 NEGATIVE  NEGATIVE mg/dL Final    Bilirubin, Urine 08/09/2024 NEGATIVE  NEGATIVE Final    Urobilinogen, Urine 08/09/2024 Normal  Normal mg/dL Final    Nitrite, Urine 08/09/2024 NEGATIVE  NEGATIVE Final    Leukocyte Esterase, Urine 08/09/2024 NEGATIVE  NEGATIVE Final    Lactate 08/09/2024 2.4 (H)  0.4 - 2.0 mmol/L Final    Ventricular Rate 08/09/2024 86  BPM Final    Atrial Rate 08/09/2024 86  BPM Final    SC Interval 08/09/2024 146  ms Final    QRS Duration 08/09/2024 76  ms Final    QT Interval 08/09/2024 382  ms Final    QTC Calculation(Bazett) 08/09/2024 457  ms Final    P Axis 08/09/2024 67  degrees Final    R Axis 08/09/2024 75  degrees Final    T Axis 08/09/2024 61  degrees Final    QRS Count 08/09/2024 14  beats Final    Q Onset 08/09/2024 224  ms Final    P Onset 08/09/2024 151  ms Final    P Offset 08/09/2024 198  ms Final    T Offset 08/09/2024 415  ms Final    QTC Fredericia 08/09/2024 431  ms Final    Flu A Result 08/09/2024 Not Detected  Not Detected Final    Flu B Result 08/09/2024 Not Detected   Not Detected Final    Coronavirus 2019, PCR 08/09/2024 Not Detected  Not Detected Final    Troponin I, High Sensitivity 08/09/2024 <3  0 - 20 ng/L Final    Troponin I, High Sensitivity 08/09/2024 <3  0 - 20 ng/L Final    Campylobacter Group 08/09/2024 Detected (A)  Not Detected Final    Salmonella species 08/09/2024 Not Detected  Not Detected Final    Shigella species 08/09/2024 Not Detected  Not Detected Final    Vibrio Group 08/09/2024 Not Detected  Not Detected Final    Yersinia Enterocolitica 08/09/2024 Not Detected  Not Detected Final    Shiga Toxin 1 08/09/2024 Not Detected  Not Detected Final    Shiga Toxin 2 08/09/2024 Not Detected  Not Detected Final    Norovirus GI/GII 08/09/2024 Not Detected  Not Detected Final    Rotavirus A 08/09/2024 Not Detected  Not Detected Final    Ova + Parasite Exam 08/09/2024 Negative  Negative Final      INTERPRETIVE INFORMATION: Ova and Parasite, Fecal    Method for identification of Ova and Parasites includes wet mount   and trichrome stains.    Due to the various shedding cycles of many parasites, three   separate stool specimens collected over a 5-7-day period are   recommended for ova and parasite examination. A single negative   result does not rule out the possibility of a parasitic infection.   The ova and parasite exam does not specifically detect   Cryptosporidium, Cyclospora, Cystoisospora, and Microsporidia. For   additional test information refer to [employer] consult,   [email or web address]  Performed By: [facility]  [address] [place] [address]  : Samir Brennan MD, PhD  CLIA Number: [id number]    Giardia lamblia Antigen 08/09/2024 Negative  Negative Final    Performed By: [facility]  [address] [place] [address]  : Samir Brennan MD, PhD  CLIA Number: [id number]    Cryptosporidium Antigen by EIA 08/09/2024 Negative  Negative Final    Performed By: [facility]  [address] [place] [id number]  : Samir Brennan MD, PhD  CLIA Number: [id number]    WBC, Urine 08/09/2024 1-5  1-5, NONE /HPF Final    RBC, Urine 08/09/2024 1-2  NONE, 1-2, 3-5 /HPF Final    Mucus, Urine 08/09/2024 FEW  Reference range not established. /LPF Final    Lactate 08/09/2024 0.7  0.4 - 2.0 mmol/L Final       Assessment/Plan   Diagnoses and all orders for this visit:  Lactic acid acidosis  -     Lactate; Future  Acute colitis  -     Referral to Primary Care  -     Comprehensive metabolic panel; Future  Diarrhea, unspecified type  -     Referral to Primary Care  Hypokalemia  Hypomagnesemia  -     Magnesium; Future    Patient completed Cipro and Flagyl  Follow-up GI

## 2024-12-18 ENCOUNTER — APPOINTMENT (OUTPATIENT)
Dept: DERMATOLOGY | Facility: CLINIC | Age: 39
End: 2024-12-18
Payer: COMMERCIAL

## 2024-12-18 DIAGNOSIS — L90.5 SCAR CONDITIONS AND FIBROSIS OF SKIN: Primary | ICD-10-CM

## 2024-12-18 PROCEDURE — 99213 OFFICE O/P EST LOW 20 MIN: CPT | Performed by: DERMATOLOGY

## 2025-02-12 ENCOUNTER — APPOINTMENT (OUTPATIENT)
Dept: PRIMARY CARE | Facility: CLINIC | Age: 40
End: 2025-02-12
Payer: COMMERCIAL

## 2025-02-12 VITALS
SYSTOLIC BLOOD PRESSURE: 125 MMHG | HEIGHT: 69 IN | DIASTOLIC BLOOD PRESSURE: 91 MMHG | OXYGEN SATURATION: 100 % | WEIGHT: 141 LBS | HEART RATE: 89 BPM | BODY MASS INDEX: 20.88 KG/M2

## 2025-02-12 DIAGNOSIS — N50.89 SCROTAL SWELLING: ICD-10-CM

## 2025-02-12 DIAGNOSIS — D23.4 CYST, DERMOID, SCALP AND NECK: ICD-10-CM

## 2025-02-12 DIAGNOSIS — Z11.3 SCREEN FOR STD (SEXUALLY TRANSMITTED DISEASE): Primary | ICD-10-CM

## 2025-02-12 PROCEDURE — 3008F BODY MASS INDEX DOCD: CPT | Performed by: FAMILY MEDICINE

## 2025-02-12 PROCEDURE — 99213 OFFICE O/P EST LOW 20 MIN: CPT | Performed by: FAMILY MEDICINE

## 2025-02-12 ASSESSMENT — PATIENT HEALTH QUESTIONNAIRE - PHQ9
2. FEELING DOWN, DEPRESSED OR HOPELESS: NOT AT ALL
1. LITTLE INTEREST OR PLEASURE IN DOING THINGS: NOT AT ALL
SUM OF ALL RESPONSES TO PHQ9 QUESTIONS 1 AND 2: 0

## 2025-02-12 NOTE — PROGRESS NOTES
Subjective   Patient ID: Rufino Medeiros [id number] is a 39 y.o. male who presents for Follow-up (Ingrown hairs in groin area and profuse sweating).    HPI   Patient is noticing bumps on lower part to his scrotum.  For last 1 week.  Denies any pain.  Denies any redness.  No fever or chills.  No difficulty with intercourse  No penile discharge  Patient also noticing profuse sweating and has cyst on the neck want to get it removed.    Social History     Tobacco Use    Smoking status: Every Day     Current packs/day: 0.50     Average packs/day: 0.5 packs/day for 20.0 years (10.0 ttl pk-yrs)     Types: Cigarettes    Smokeless tobacco: Never   Vaping Use    Vaping status: Never Used   Substance Use Topics    Alcohol use: Yes     Alcohol/week: 30.0 standard drinks of alcohol     Types: 30 Cans of beer per week     Comment: EVERY OTHER DAY -CASUAL    Drug use: Yes     Types: Marijuana       Allergies   Allergen Reactions    Bee Venom Protein (Honey Bee) Hives    Diazepam Hives and Rash       Current Outpatient Medications   Medication Sig Dispense Refill    albuterol (Ventolin HFA) 90 mcg/actuation inhaler Inhale 2 puffs every 6 hours if needed for wheezing. 18 g 3    cyanocobalamin, vitamin B-12, (VITAMIN B-12 ORAL) Take by mouth.      DULoxetine (Cymbalta) 20 mg DR capsule Take 1 capsule (20 mg) by mouth once daily.      lidocaine (Lidoderm) 5 % patch Place 1 patch on the skin once daily.      meloxicam (Mobic) 15 mg tablet Take 1 tablet (15 mg) by mouth once daily.      omeprazole (PriLOSEC) 40 mg DR capsule Take 1 capsule (40 mg) by mouth once daily. 90 capsule 1    acetaminophen (Tylenol Extra Strength) 500 mg tablet Take 2 tablets (1,000 mg) by mouth if needed. (Patient not taking: Reported on 2/12/2025)      baclofen (Lioresal) 10 mg tablet Take 1 tablet (10 mg) by mouth 3 times a day as needed for muscle spasms. (Patient not taking: Reported on 2/12/2025) 180 tablet 1    methylPREDNISolone (Medrol Dospak) 4 mg  "tablets Follow schedule on package instructions (Patient not taking: Reported on 2/12/2025) 21 tablet 0    nicotine (Nicoderm CQ) 14 mg/24 hr patch Place 1 patch over 24 hours on the skin once every 24 hours. 30 patch 0    nicotine (Nicoderm CQ) 21 mg/24 hr patch Place 1 patch over 24 hours on the skin once every 24 hours. 30 patch 0    nicotine (Nicoderm CQ) 7 mg/24 hr patch Place 1 patch over 24 hours on the skin once every 24 hours. 30 patch 0    ondansetron ODT (Zofran-ODT) 4 mg disintegrating tablet Take 1 tablet (4 mg) by mouth every 8 hours if needed for nausea or vomiting. (Patient not taking: Reported on 2/12/2025) 15 tablet 0    tiotropium (Spiriva) 18 mcg inhalation capsule Place 1 capsule (18 mcg) into inhaler and inhale once daily. 30 capsule 2    tiotropium-olodateroL (Stiolto Respimat) 2.5-2.5 mcg/actuation mist inhaler Inhale 2 Inhalations once daily. (Patient not taking: Reported on 2/12/2025) 4 g 3     No current facility-administered medications for this visit.       Physical Exam  BP (!) 125/91   Pulse 89   Ht 1.753 m (5' 9\")   Wt 64 kg (141 lb)   SpO2 100%   BMI 20.82 kg/m²     General Appearance:  Alert, cooperative, no distress,   Head:  Normocephalic, atraumatic   Eyes:  PERRL, conjunctiva/corneas clear, EOM's intact,    Lungs:   Clear to auscultation bilaterally, respirations unlabored   Heart:  Regular rate and rhythm, S1 and S2 normal, no murmur,    Neurologic: Normal                              Scrotum - Minimal swelling , bumps on lower part of Scrotum                                Back of neck : 1 cm firm swelling on nape of the neck, no erythema or tenderness  No visits with results within 3 Month(s) from this visit.   Latest known visit with results is:   Admission on 08/09/2024, Discharged on 08/10/2024   Component Date Value Ref Range Status    WBC 08/09/2024 6.4  4.4 - 11.3 x10*3/uL Final    nRBC 08/09/2024 0.0  0.0 - 0.0 /100 WBCs Final    RBC 08/09/2024 3.91 (L)  4.50 - " 5.90 x10*6/uL Final    Hemoglobin 08/09/2024 13.1 (L)  13.5 - 17.5 g/dL Final    Hematocrit 08/09/2024 37.1 (L)  41.0 - 52.0 % Final    MCV 08/09/2024 95  80 - 100 fL Final    MCH 08/09/2024 33.5  26.0 - 34.0 pg Final    MCHC 08/09/2024 35.3  32.0 - 36.0 g/dL Final    RDW 08/09/2024 12.0  11.5 - 14.5 % Final    Platelets 08/09/2024 184  150 - 450 x10*3/uL Final    Neutrophils % 08/09/2024 63.5  40.0 - 80.0 % Final    Immature Granulocytes %, Automated 08/09/2024 0.3  0.0 - 0.9 % Final    Immature Granulocyte Count (IG) includes promyelocytes, myelocytes and metamyelocytes but does not include bands. Percent differential counts (%) should be interpreted in the context of the absolute cell counts (cells/UL).    Lymphocytes % 08/09/2024 23.5  13.0 - 44.0 % Final    Monocytes % 08/09/2024 11.8  2.0 - 10.0 % Final    Eosinophils % 08/09/2024 0.6  0.0 - 6.0 % Final    Basophils % 08/09/2024 0.3  0.0 - 2.0 % Final    Neutrophils Absolute 08/09/2024 4.07  1.20 - 7.70 x10*3/uL Final    Percent differential counts (%) should be interpreted in the context of the absolute cell counts (cells/uL).    Immature Granulocytes Absolute, Au* 08/09/2024 0.02  0.00 - 0.70 x10*3/uL Final    Lymphocytes Absolute 08/09/2024 1.51  1.20 - 4.80 x10*3/uL Final    Monocytes Absolute 08/09/2024 0.76  0.10 - 1.00 x10*3/uL Final    Eosinophils Absolute 08/09/2024 0.04  0.00 - 0.70 x10*3/uL Final    Basophils Absolute 08/09/2024 0.02  0.00 - 0.10 x10*3/uL Final    Glucose 08/09/2024 125 (H)  74 - 99 mg/dL Final    Sodium 08/09/2024 141  136 - 145 mmol/L Final    Potassium 08/09/2024 3.3 (L)  3.5 - 5.3 mmol/L Final    Chloride 08/09/2024 99  98 - 107 mmol/L Final    Bicarbonate 08/09/2024 33 (H)  21 - 32 mmol/L Final    Anion Gap 08/09/2024 12  10 - 20 mmol/L Final    Urea Nitrogen 08/09/2024 4 (L)  6 - 23 mg/dL Final    Creatinine 08/09/2024 0.97  0.50 - 1.30 mg/dL Final    eGFR 08/09/2024 >90  >60 mL/min/1.73m*2 Final    Calculations of estimated  GFR are performed using the 2021 CKD-EPI Study Refit equation without the race variable for the IDMS-Traceable creatinine methods.  https://jasn.asnjournals.org/content/early/2021/09/22/ASN.7837553173    Calcium 08/09/2024 9.0  8.6 - 10.3 mg/dL Final    Albumin 08/09/2024 4.0  3.4 - 5.0 g/dL Final    Alkaline Phosphatase 08/09/2024 59  33 - 120 U/L Final    Total Protein 08/09/2024 6.8  6.4 - 8.2 g/dL Final    AST 08/09/2024 16  9 - 39 U/L Final    Bilirubin, Total 08/09/2024 0.3  0.0 - 1.2 mg/dL Final    ALT 08/09/2024 15  10 - 52 U/L Final    Patients treated with Sulfasalazine may generate falsely decreased results for ALT.    Magnesium 08/09/2024 1.41 (L)  1.60 - 2.40 mg/dL Final    Color, Urine 08/09/2024 Yellow  Light-Yellow, Yellow, Dark-Yellow Final    Appearance, Urine 08/09/2024 Clear  Clear Final    Specific Gravity, Urine 08/09/2024 1.019  1.005 - 1.035 Final    pH, Urine 08/09/2024 6.0  5.0, 5.5, 6.0, 6.5, 7.0, 7.5, 8.0 Final    Protein, Urine 08/09/2024 20 (TRACE)  NEGATIVE, 10 (TRACE), 20 (TRACE) mg/dL Final    Glucose, Urine 08/09/2024 Normal  Normal mg/dL Final    Blood, Urine 08/09/2024 NEGATIVE  NEGATIVE Final    Ketones, Urine 08/09/2024 NEGATIVE  NEGATIVE mg/dL Final    Bilirubin, Urine 08/09/2024 NEGATIVE  NEGATIVE Final    Urobilinogen, Urine 08/09/2024 Normal  Normal mg/dL Final    Nitrite, Urine 08/09/2024 NEGATIVE  NEGATIVE Final    Leukocyte Esterase, Urine 08/09/2024 NEGATIVE  NEGATIVE Final    Lactate 08/09/2024 2.4 (H)  0.4 - 2.0 mmol/L Final    Ventricular Rate 08/09/2024 86  BPM Final    Atrial Rate 08/09/2024 86  BPM Final    MI Interval 08/09/2024 146  ms Final    QRS Duration 08/09/2024 76  ms Final    QT Interval 08/09/2024 382  ms Final    QTC Calculation(Bazett) 08/09/2024 457  ms Final    P Axis 08/09/2024 67  degrees Final    R Axis 08/09/2024 75  degrees Final    T Axis 08/09/2024 61  degrees Final    QRS Count 08/09/2024 14  beats Final    Q Onset 08/09/2024 224  ms Final     P Onset 08/09/2024 151  ms Final    P Offset 08/09/2024 198  ms Final    T Offset 08/09/2024 415  ms Final    QTC Fredericia 08/09/2024 431  ms Final    Flu A Result 08/09/2024 Not Detected  Not Detected Final    Flu B Result 08/09/2024 Not Detected  Not Detected Final    Coronavirus 2019, PCR 08/09/2024 Not Detected  Not Detected Final    Troponin I, High Sensitivity 08/09/2024 <3  0 - 20 ng/L Final    Troponin I, High Sensitivity 08/09/2024 <3  0 - 20 ng/L Final    Campylobacter Group 08/09/2024 Detected (A)  Not Detected Final    Salmonella species 08/09/2024 Not Detected  Not Detected Final    Shigella species 08/09/2024 Not Detected  Not Detected Final    Vibrio Group 08/09/2024 Not Detected  Not Detected Final    Yersinia Enterocolitica 08/09/2024 Not Detected  Not Detected Final    Shiga Toxin 1 08/09/2024 Not Detected  Not Detected Final    Shiga Toxin 2 08/09/2024 Not Detected  Not Detected Final    Norovirus GI/GII 08/09/2024 Not Detected  Not Detected Final    Rotavirus A 08/09/2024 Not Detected  Not Detected Final    Ova + Parasite Exam 08/09/2024 Negative  Negative Final      INTERPRETIVE INFORMATION: Ova and Parasite, Fecal    Method for identification of Ova and Parasites includes wet mount   and trichrome stains.    Due to the various shedding cycles of many parasites, three   separate stool specimens collected over a 5-7-day period are   recommended for ova and parasite examination. A single negative   result does not rule out the possibility of a parasitic infection.   The ova and parasite exam does not specifically detect   Cryptosporidium, Cyclospora, Cystoisospora, and Microsporidia. For   additional test information refer to [employer] consult,   [email or web address]  Performed By: [facility]  [address] [place] [address]  : Samir Brennan MD, PhD  CLIA Number: [id number]    Giardia lamblia Antigen 08/09/2024 Negative  Negative Final     Performed By: [facility]  [address] [place] [address]  : Samir Brennan MD, PhD  CLIA Number: [id number]    Cryptosporidium Antigen by EIA 08/09/2024 Negative  Negative Final    Performed By: [facility]  [address] [place] [address]  : Samir Brennan MD, PhD  CLIA Number: [id number]    WBC, Urine 08/09/2024 1-5  1-5, NONE /HPF Final    RBC, Urine 08/09/2024 1-2  NONE, 1-2, 3-5 /HPF Final    Mucus, Urine 08/09/2024 FEW  Reference range not established. /LPF Final    Lactate 08/09/2024 0.7  0.4 - 2.0 mmol/L Final       Assessment/Plan   Diagnoses and all orders for this visit:  Screen for STD (sexually transmitted disease)  -     C. trachomatis / N. gonorrhoeae, Amplified, Urogenital; Future  -     Hepatitis B Core Antibody, IgM; Future  -     Hepatitis C Antibody; Future  -     HIV 1/2 Antigen/Antibody Screen with Reflex to Confirmation; Future  -     RPR Monitoring; Future  Scrotal swelling  -     US scrotum; Future  Cyst, dermoid, scalp and neck  -     Referral to General Surgery; Future    Pt was reassured, will screen STD    Refer to gen surgery for cyst removal

## 2025-02-15 LAB
C TRACH RRNA SPEC QL NAA+PROBE: NOT DETECTED
HBV CORE IGM SERPL QL IA: NORMAL
HCV AB SERPL QL IA: NORMAL
HIV 1+2 AB+HIV1 P24 AG SERPL QL IA: NORMAL
N GONORRHOEA RRNA SPEC QL NAA+PROBE: NOT DETECTED
QUEST GC CT AMPLIFIED (ALWAYS MESSAGE): NORMAL
RPR SER QL: NORMAL

## 2025-02-28 ENCOUNTER — HOSPITAL ENCOUNTER (OUTPATIENT)
Dept: RADIOLOGY | Facility: CLINIC | Age: 40
Discharge: HOME | End: 2025-02-28
Payer: COMMERCIAL

## 2025-02-28 DIAGNOSIS — N50.89 SCROTAL SWELLING: ICD-10-CM

## 2025-02-28 PROCEDURE — 93975 VASCULAR STUDY: CPT

## 2025-03-03 ENCOUNTER — APPOINTMENT (OUTPATIENT)
Dept: SURGERY | Facility: CLINIC | Age: 40
End: 2025-03-03
Payer: COMMERCIAL

## 2025-03-03 VITALS
TEMPERATURE: 97.7 F | HEART RATE: 110 BPM | OXYGEN SATURATION: 98 % | BODY MASS INDEX: 22.15 KG/M2 | DIASTOLIC BLOOD PRESSURE: 80 MMHG | SYSTOLIC BLOOD PRESSURE: 120 MMHG | WEIGHT: 150 LBS

## 2025-03-03 DIAGNOSIS — D23.4 CYST, DERMOID, SCALP AND NECK: ICD-10-CM

## 2025-03-03 PROCEDURE — 99203 OFFICE O/P NEW LOW 30 MIN: CPT | Performed by: SURGERY

## 2025-03-03 NOTE — PROGRESS NOTES
Subjective   Patient ID: Rufino Medeiros is a 39 y.o. male who presents with complaints and palpable painful soft tissue mass in the posterior neck.  The patient has above-mentioned symptoms for last 2 years.  Significant increase in size and significant discomfort, risk of the infection    Review of Systems documentary consistent with a palpable painful mass in the posterior neck.  Review of all other 10 system is negative  Physical Exam pupils equal bilaterally, mucosa moist, bilateral breath sounds, regular rate, abdomen soft, nontender, palpable peripheral pulse, no focal neurological motor deficits.  Musculoskeletal exam within normal limits, ENT exam within normal limits.  On the local exam there is there is a palpable painful soft tissue mass in the posterior neck, size approximately 3.7 cm.  Tightly adhesed to surrounding tissue    Objective   I reviewed all available data including lab results, radiological studies, previous reports and notes.  Encounter Diagnosis   Name Primary?    Cyst, dermoid, scalp and neck       Patient Active Problem List   Diagnosis    SCC (squamous cell carcinoma)    Alcohol use    Screen for STD (sexually transmitted disease)    Routine physical examination    Sore throat    Oral aphthous ulcer    Squamous cell carcinoma of skin of left upper limb, including shoulder    Aphthous ulcer    Scar condition and fibrosis of skin    Personal history of other malignant neoplasm of skin    Melanocytic nevi of trunk    Melanocytic nevi of right lower limb, including hip    Hemangioma of other sites    Blisters with epidermal loss due to burn (second degree) of two or more digits of hand including thumb    Blisters with epidermal loss due to burn (second degree) of palm of hand    Other melanin hyperpigmentation    Back disorder    Other chronic pain    Osteomyelitis    Anxiety    Long term (current) use of antibiotics    Gastroesophageal reflux disease    Non-recurrent acute suppurative  otitis media of both ears without spontaneous rupture of tympanic membranes    Muscle stiffness      Allergies   Allergen Reactions    Bee Venom Protein (Honey Bee) Hives    Diazepam Hives and Rash      Medication Documentation Review Audit       Reviewed by Jerry Perry MD (Physician) on 25 at 1626      Medication Order Taking? Sig Documenting Provider Last Dose Status   acetaminophen (Tylenol Extra Strength) 500 mg tablet [id number] No Take 2 tablets (1,000 mg) by mouth if needed.   Patient not taking: Reported on 2025    Historical Provider, MD Taking Active   albuterol (Ventolin HFA) 90 mcg/actuation inhaler [id number] No Inhale 2 puffs every 6 hours if needed for wheezing. VERONICA Zamora Taking Active   baclofen (Lioresal) 10 mg tablet [id number] No Take 1 tablet (10 mg) by mouth 3 times a day as needed for muscle spasms.   Patient not taking: Reported on 2025    Samreen Ovalles MD Not Taking Active   cyanocobalamin, vitamin B-12, (VITAMIN B-12 ORAL) [id number] No Take by mouth. Historical Provider, MD Not Taking Active   DULoxetine (Cymbalta) 20 mg DR capsule [id number] No Take 1 capsule (20 mg) by mouth once daily. Historical Provider, MD Taking Active   lidocaine (Lidoderm) 5 % patch [id number] No Place 1 patch on the skin once daily. Historical Provider, MD Not Taking Active   meloxicam (Mobic) 15 mg tablet [id number] No Take 1 tablet (15 mg) by mouth once daily. Historical Provider, MD Not Taking Active   methylPREDNISolone (Medrol Dospak) 4 mg tablets [id number] No Follow schedule on package instructions   Patient not taking: Reported on 2025    Braden Davidson PA-C Not Taking Active   nicotine (Nicoderm CQ) 14 mg/24 hr patch [id number]  Place 1 patch over 24 hours on the skin once every 24 hours. VERONICA Zamora   24 2359   nicotine (Nicoderm CQ) 21 mg/24 hr patch [id number]  Place 1 patch over 24 hours on the skin once every 24 hours. Kacey Roca  SAMANTHA-CNP   24 235   nicotine (Nicoderm CQ) 7 mg/24 hr patch [id number]  Place 1 patch over 24 hours on the skin once every 24 hours. VERONICA Zamora   24 235   omeprazole (PriLOSEC) 40 mg DR capsule [id number] No Take 1 capsule (40 mg) by mouth once daily. Samreen Ovalles MD Taking Active   ondansetron ODT (Zofran-ODT) 4 mg disintegrating tablet [id number] No Take 1 tablet (4 mg) by mouth every 8 hours if needed for nausea or vomiting.   Patient not taking: Reported on 2025    Alex Esqueda MD Not Taking Active   tiotropium (Spiriva) 18 mcg inhalation capsule [id number] No Place 1 capsule (18 mcg) into inhaler and inhale once daily. Samreen Ovalles MD Taking  24   tiotropium-olodateroL (Stiolto Respimat) 2.5-2.5 mcg/actuation mist inhaler [id number] No Inhale 2 Inhalations once daily.   Patient not taking: Reported on 2025    VERONICA Zamora Not Taking Active                    Past Medical History:   Diagnosis Date    Cancer (Multi) 2023    Fibromyalgia     Headache      Social History     Tobacco Use   Smoking Status Every Day    Current packs/day: 0.50    Average packs/day: 0.5 packs/day for 20.0 years (10.0 ttl pk-yrs)    Types: Cigarettes   Smokeless Tobacco Never     Family History   Problem Relation Name Age of Onset    Alcohol abuse Mother        Past Surgical History:   Procedure Laterality Date    EYE SURGERY      FINGER SURGERY      WISDOM TOOTH EXTRACTION         Assessment/Plan   The patient with a painful soft tissue mass in the posterior neck.  Patient has indication for excision of soft tissue mass for tissue diagnosis symptoms control.  Patient will require skin defect with repair with a adjacent tissue rearrangement.  Risks, benefits, alternative treatment were explained to the patient.  All questions were answered.  Informed consent was obtained.      Jerry Perry MD

## 2025-03-04 ENCOUNTER — TELEPHONE (OUTPATIENT)
Dept: PRIMARY CARE | Facility: CLINIC | Age: 40
End: 2025-03-04
Payer: COMMERCIAL

## 2025-03-04 NOTE — TELEPHONE ENCOUNTER
PT would like a call back regarding his US scrotum w Doppler result.      He seems anxious and would like to know.

## 2025-03-05 NOTE — TELEPHONE ENCOUNTER
----- Message from Sheila Freed sent at 3/4/2025  4:47 PM EST -----  Shows varicocele which is largely benign.     If it is non-painful, can follow up with PCP in one week to discuss checking semen analysis to ensure to affect on fertility.     If it is painful, would recommend referral to  Urology for surgical evaluation.

## 2025-03-06 ENCOUNTER — TELEPHONE (OUTPATIENT)
Dept: PRIMARY CARE | Facility: CLINIC | Age: 40
End: 2025-03-06
Payer: COMMERCIAL

## 2025-03-06 DIAGNOSIS — I86.1 VARICOCELE: ICD-10-CM

## 2025-03-06 NOTE — TELEPHONE ENCOUNTER
Patient called back, scrotum and area around is painful. Please call [phone] when referral is placed.    Patient has appointment scheduled with Dr. Ovalles for 3/13 at 1:00

## 2025-03-13 ENCOUNTER — APPOINTMENT (OUTPATIENT)
Dept: PRIMARY CARE | Facility: CLINIC | Age: 40
End: 2025-03-13
Payer: COMMERCIAL

## 2025-03-13 VITALS
HEIGHT: 69 IN | WEIGHT: 141.8 LBS | HEART RATE: 98 BPM | BODY MASS INDEX: 21 KG/M2 | SYSTOLIC BLOOD PRESSURE: 156 MMHG | DIASTOLIC BLOOD PRESSURE: 105 MMHG | OXYGEN SATURATION: 97 %

## 2025-03-13 DIAGNOSIS — K21.9 GASTROESOPHAGEAL REFLUX DISEASE, UNSPECIFIED WHETHER ESOPHAGITIS PRESENT: ICD-10-CM

## 2025-03-13 DIAGNOSIS — I86.1 VARICOCELE: Primary | ICD-10-CM

## 2025-03-13 PROCEDURE — 3008F BODY MASS INDEX DOCD: CPT | Performed by: FAMILY MEDICINE

## 2025-03-13 PROCEDURE — 99213 OFFICE O/P EST LOW 20 MIN: CPT | Performed by: FAMILY MEDICINE

## 2025-03-13 RX ORDER — OMEPRAZOLE 40 MG/1
40 CAPSULE, DELAYED RELEASE ORAL DAILY
Qty: 90 CAPSULE | Refills: 1 | Status: SHIPPED | OUTPATIENT
Start: 2025-03-13

## 2025-03-13 ASSESSMENT — PATIENT HEALTH QUESTIONNAIRE - PHQ9
1. LITTLE INTEREST OR PLEASURE IN DOING THINGS: NOT AT ALL
SUM OF ALL RESPONSES TO PHQ9 QUESTIONS 1 AND 2: 0
2. FEELING DOWN, DEPRESSED OR HOPELESS: NOT AT ALL

## 2025-03-13 NOTE — PROGRESS NOTES
Subjective   Patient ID: Rufino Medeiros [id number] is a 39 y.o. male who presents for Pain (Scrotal pain).    HPI   Patient is noticing on and off left scrotal pain.  Ultrasound with left sided varicocele.  Patient made aware to take ibuprofen and get urology evaluation.  All test result discussed with the patient.  No concern for STD at this point.    Social History     Tobacco Use    Smoking status: Every Day     Current packs/day: 0.50     Average packs/day: 0.5 packs/day for 20.0 years (10.0 ttl pk-yrs)     Types: Cigarettes    Smokeless tobacco: Never   Vaping Use    Vaping status: Never Used   Substance Use Topics    Alcohol use: Yes     Alcohol/week: 30.0 standard drinks of alcohol     Types: 30 Cans of beer per week     Comment: EVERY OTHER DAY -CASUAL    Drug use: Yes     Types: Marijuana       Allergies   Allergen Reactions    Bee Venom Protein (Honey Bee) Hives    Diazepam Hives and Rash       Current Outpatient Medications   Medication Sig Dispense Refill    albuterol (Ventolin HFA) 90 mcg/actuation inhaler Inhale 2 puffs every 6 hours if needed for wheezing. 18 g 3    omeprazole (PriLOSEC) 40 mg DR capsule Take 1 capsule (40 mg) by mouth once daily. 90 capsule 1    acetaminophen (Tylenol Extra Strength) 500 mg tablet Take 2 tablets (1,000 mg) by mouth if needed. (Patient not taking: Reported on 3/13/2025)      baclofen (Lioresal) 10 mg tablet Take 1 tablet (10 mg) by mouth 3 times a day as needed for muscle spasms. (Patient not taking: Reported on 8/21/2024) 180 tablet 1    cyanocobalamin, vitamin B-12, (VITAMIN B-12 ORAL) Take by mouth. (Patient not taking: Reported on 3/13/2025)      DULoxetine (Cymbalta) 20 mg DR capsule Take 1 capsule (20 mg) by mouth once daily. (Patient not taking: Reported on 3/13/2025)      lidocaine (Lidoderm) 5 % patch Place 1 patch on the skin once daily. (Patient not taking: Reported on 3/13/2025)      meloxicam (Mobic) 15 mg tablet Take 1 tablet (15 mg) by mouth once daily.  "(Patient not taking: Reported on 3/13/2025)      methylPREDNISolone (Medrol Dospak) 4 mg tablets Follow schedule on package instructions (Patient not taking: Reported on 12/8/2023) 21 tablet 0    nicotine (Nicoderm CQ) 14 mg/24 hr patch Place 1 patch over 24 hours on the skin once every 24 hours. 30 patch 0    nicotine (Nicoderm CQ) 21 mg/24 hr patch Place 1 patch over 24 hours on the skin once every 24 hours. 30 patch 0    nicotine (Nicoderm CQ) 7 mg/24 hr patch Place 1 patch over 24 hours on the skin once every 24 hours. 30 patch 0    ondansetron ODT (Zofran-ODT) 4 mg disintegrating tablet Take 1 tablet (4 mg) by mouth every 8 hours if needed for nausea or vomiting. (Patient not taking: Reported on 8/21/2024) 15 tablet 0    tiotropium (Spiriva) 18 mcg inhalation capsule Place 1 capsule (18 mcg) into inhaler and inhale once daily. 30 capsule 2    tiotropium-olodateroL (Stiolto Respimat) 2.5-2.5 mcg/actuation mist inhaler Inhale 2 Inhalations once daily. (Patient not taking: Reported on 8/21/2024) 4 g 3     No current facility-administered medications for this visit.       Physical Exam  BP (!) 156/105   Pulse 98   Ht 1.753 m (5' 9\")   Wt 64.3 kg (141 lb 12.8 oz)   SpO2 97%   BMI 20.94 kg/m²     General Appearance:  Alert, cooperative, no distress,   Head:  Normocephalic, atraumatic   Eyes:  PERRL, conjunctiva/corneas clear, EOM's intact,    Lungs:   Clear to auscultation bilaterally, respirations unlabored   Heart:  Regular rate and rhythm, S1 and S2 normal, no murmur,    Neurologic: Normal      Office Visit on 02/12/2025   Component Date Value Ref Range Status    CHLAMYDIA TRACHOMATIS RNA, TMA, UR* 02/12/2025 NOT DETECTED  NOT DETECTED Final    NEISSERIA GONORRHOEAE RNA, TMA, UR* 02/12/2025 NOT DETECTED  NOT DETECTED Final    (Always Message) 02/12/2025    Final    Comment: The analytical performance characteristics of this  assay, when used to test SurePath() specimens have been  determined by [facility]. The modifications have  not been cleared or approved by the FDA. This assay has  been validated pursuant to the CLIA regulations and is  used for clinical purposes.     For additional information, please refer to  https://[employer]BLOVES/faq/XDI873  (This link is being provided for information/  educational purposes only.)         HEPATITIS B CORE ANTIBODY (IGM) 02/12/2025 NON-REACTIVE  NON-REACTIVE Final    Comment:    For additional information, please refer to   http://[employer].BLOVES/faq/NXT498   (This link is being provided for informational/  educational purposes only.)         HEPATITIS C ANTIBODY 02/12/2025 NON-REACTIVE  NON-REACTIVE Final    Comment:    HCV antibody was non-reactive. There is no laboratory   evidence of HCV infection.     In most cases, no further action is required. However,  if recent HCV exposure is suspected, a test for HCV RNA  (test code 30306) is suggested.     For additional information please refer to  http://[employer]/faq/AQC32i5  (This link is being provided for informational/  educational purposes only.)         HIV AG/AB, 4TH GEN 02/12/2025 NON-REACTIVE  NON-REACTIVE Final    Comment: HIV-1 antigen and HIV-1/HIV-2 antibodies were not  detected. There is no laboratory evidence of HIV  infection.     PLEASE NOTE: This information has been disclosed to  you from records whose confidentiality may be  protected by state law.  If your state requires such  protection, then the state law prohibits you from  making any further disclosure of the information  without the specific written consent of the person  to whom it pertains, or as otherwise permitted by law.  A general authorization for the release of medical or  other information is NOT sufficient for this purpose.      For additional information please refer to  http://[employer].com.BLOVES/faq/ZXX262  (This link is being provided for  informational/  educational purposes only.)        The performance of this assay has not been clinically  validated in patients less than 2 years old.         RPR (MONITOR) W/REFL TITER 02/12/2025 NON-REACTIVE  NON-REACTIVE Final       Assessment/Plan   Diagnoses and all orders for this visit:  Rufino was seen today for pain.  Diagnoses and all orders for this visit:  Varicocele (Primary)  Gastroesophageal reflux disease, unspecified whether esophagitis present  -     omeprazole (PriLOSEC) 40 mg DR capsule; Take 1 capsule (40 mg) by mouth once daily.    Refer to urology  GERD continue omeprazole tolerating it well.

## 2025-04-05 ENCOUNTER — APPOINTMENT (OUTPATIENT)
Dept: CARDIOLOGY | Facility: HOSPITAL | Age: 40
End: 2025-04-05
Payer: COMMERCIAL

## 2025-04-05 ENCOUNTER — HOSPITAL ENCOUNTER (EMERGENCY)
Facility: HOSPITAL | Age: 40
Discharge: HOME | End: 2025-04-06
Attending: STUDENT IN AN ORGANIZED HEALTH CARE EDUCATION/TRAINING PROGRAM
Payer: COMMERCIAL

## 2025-04-05 ENCOUNTER — APPOINTMENT (OUTPATIENT)
Dept: RADIOLOGY | Facility: HOSPITAL | Age: 40
End: 2025-04-05
Payer: COMMERCIAL

## 2025-04-05 DIAGNOSIS — R07.9 CHEST PAIN, UNSPECIFIED TYPE: Primary | ICD-10-CM

## 2025-04-05 LAB
ANION GAP SERPL CALC-SCNC: 14 MMOL/L (ref 10–20)
APPEARANCE UR: CLEAR
BASOPHILS # BLD AUTO: 0.04 X10*3/UL (ref 0–0.1)
BASOPHILS NFR BLD AUTO: 0.7 %
BILIRUB UR STRIP.AUTO-MCNC: NEGATIVE MG/DL
BUN SERPL-MCNC: 14 MG/DL (ref 6–23)
CALCIUM SERPL-MCNC: 9 MG/DL (ref 8.6–10.3)
CARDIAC TROPONIN I PNL SERPL HS: <3 NG/L (ref 0–20)
CARDIAC TROPONIN I PNL SERPL HS: <3 NG/L (ref 0–20)
CHLORIDE SERPL-SCNC: 105 MMOL/L (ref 98–107)
CO2 SERPL-SCNC: 26 MMOL/L (ref 21–32)
COLOR UR: COLORLESS
CREAT SERPL-MCNC: 0.93 MG/DL (ref 0.5–1.3)
EGFRCR SERPLBLD CKD-EPI 2021: >90 ML/MIN/1.73M*2
EOSINOPHIL # BLD AUTO: 0.09 X10*3/UL (ref 0–0.7)
EOSINOPHIL NFR BLD AUTO: 1.5 %
ERYTHROCYTE [DISTWIDTH] IN BLOOD BY AUTOMATED COUNT: 12.1 % (ref 11.5–14.5)
GLUCOSE SERPL-MCNC: 76 MG/DL (ref 74–99)
GLUCOSE UR STRIP.AUTO-MCNC: NORMAL MG/DL
HCT VFR BLD AUTO: 41.3 % (ref 41–52)
HGB BLD-MCNC: 13.2 G/DL (ref 13.5–17.5)
IMM GRANULOCYTES # BLD AUTO: 0.02 X10*3/UL (ref 0–0.7)
IMM GRANULOCYTES NFR BLD AUTO: 0.3 % (ref 0–0.9)
KETONES UR STRIP.AUTO-MCNC: NEGATIVE MG/DL
LEUKOCYTE ESTERASE UR QL STRIP.AUTO: NEGATIVE
LYMPHOCYTES # BLD AUTO: 2.3 X10*3/UL (ref 1.2–4.8)
LYMPHOCYTES NFR BLD AUTO: 37.9 %
MCH RBC QN AUTO: 31.8 PG (ref 26–34)
MCHC RBC AUTO-ENTMCNC: 32 G/DL (ref 32–36)
MCV RBC AUTO: 100 FL (ref 80–100)
MONOCYTES # BLD AUTO: 0.5 X10*3/UL (ref 0.1–1)
MONOCYTES NFR BLD AUTO: 8.2 %
NEUTROPHILS # BLD AUTO: 3.12 X10*3/UL (ref 1.2–7.7)
NEUTROPHILS NFR BLD AUTO: 51.4 %
NITRITE UR QL STRIP.AUTO: NEGATIVE
NRBC BLD-RTO: 0 /100 WBCS (ref 0–0)
PH UR STRIP.AUTO: 5.5 [PH]
PLATELET # BLD AUTO: 208 X10*3/UL (ref 150–450)
POTASSIUM SERPL-SCNC: 4.5 MMOL/L (ref 3.5–5.3)
PROT UR STRIP.AUTO-MCNC: NEGATIVE MG/DL
RBC # BLD AUTO: 4.15 X10*6/UL (ref 4.5–5.9)
RBC # UR STRIP.AUTO: NEGATIVE MG/DL
SODIUM SERPL-SCNC: 140 MMOL/L (ref 136–145)
SP GR UR STRIP.AUTO: 1
UROBILINOGEN UR STRIP.AUTO-MCNC: NORMAL MG/DL
WBC # BLD AUTO: 6.1 X10*3/UL (ref 4.4–11.3)

## 2025-04-05 PROCEDURE — 2550000001 HC RX 255 CONTRASTS: Performed by: EMERGENCY MEDICINE

## 2025-04-05 PROCEDURE — 80048 BASIC METABOLIC PNL TOTAL CA: CPT | Performed by: STUDENT IN AN ORGANIZED HEALTH CARE EDUCATION/TRAINING PROGRAM

## 2025-04-05 PROCEDURE — 71275 CT ANGIOGRAPHY CHEST: CPT

## 2025-04-05 PROCEDURE — 85025 COMPLETE CBC W/AUTO DIFF WBC: CPT | Performed by: STUDENT IN AN ORGANIZED HEALTH CARE EDUCATION/TRAINING PROGRAM

## 2025-04-05 PROCEDURE — 93005 ELECTROCARDIOGRAM TRACING: CPT

## 2025-04-05 PROCEDURE — 71045 X-RAY EXAM CHEST 1 VIEW: CPT | Mod: FOREIGN READ | Performed by: RADIOLOGY

## 2025-04-05 PROCEDURE — 71275 CT ANGIOGRAPHY CHEST: CPT | Mod: FOREIGN READ | Performed by: RADIOLOGY

## 2025-04-05 PROCEDURE — 71045 X-RAY EXAM CHEST 1 VIEW: CPT

## 2025-04-05 PROCEDURE — 84484 ASSAY OF TROPONIN QUANT: CPT | Performed by: STUDENT IN AN ORGANIZED HEALTH CARE EDUCATION/TRAINING PROGRAM

## 2025-04-05 PROCEDURE — 81003 URINALYSIS AUTO W/O SCOPE: CPT | Performed by: EMERGENCY MEDICINE

## 2025-04-05 PROCEDURE — 36415 COLL VENOUS BLD VENIPUNCTURE: CPT | Performed by: STUDENT IN AN ORGANIZED HEALTH CARE EDUCATION/TRAINING PROGRAM

## 2025-04-05 PROCEDURE — 99285 EMERGENCY DEPT VISIT HI MDM: CPT | Mod: 25 | Performed by: STUDENT IN AN ORGANIZED HEALTH CARE EDUCATION/TRAINING PROGRAM

## 2025-04-05 RX ADMIN — IOHEXOL 65 ML: 350 INJECTION, SOLUTION INTRAVENOUS at 22:50

## 2025-04-05 NOTE — ED TRIAGE NOTES
Pt comes into ed via private auto. Pt states intermitted chest pain x1 week. Pt states sob aswell. Pt states right sided flank pain

## 2025-04-06 VITALS
OXYGEN SATURATION: 98 % | WEIGHT: 145 LBS | HEIGHT: 71 IN | DIASTOLIC BLOOD PRESSURE: 54 MMHG | TEMPERATURE: 97.9 F | RESPIRATION RATE: 18 BRPM | HEART RATE: 75 BPM | BODY MASS INDEX: 20.3 KG/M2 | SYSTOLIC BLOOD PRESSURE: 132 MMHG

## 2025-04-06 LAB — HOLD SPECIMEN: NORMAL

## 2025-04-06 NOTE — DISCHARGE INSTRUCTIONS
Please follow-up with your primary care provider in 2 to 3 days.  Return to the emergency department if develop any worsening symptoms such as worsening chest pain, shortness of breath, weakness, numbness, lightheadedness, dizziness or if concerns arise.

## 2025-04-07 LAB
ATRIAL RATE: 83 BPM
P AXIS: 73 DEGREES
P OFFSET: 195 MS
P ONSET: 154 MS
PR INTERVAL: 138 MS
Q ONSET: 223 MS
QRS COUNT: 14 BEATS
QRS DURATION: 78 MS
QT INTERVAL: 368 MS
QTC CALCULATION(BAZETT): 432 MS
QTC FREDERICIA: 410 MS
R AXIS: 77 DEGREES
T AXIS: 70 DEGREES
T OFFSET: 407 MS
VENTRICULAR RATE: 83 BPM

## 2025-04-08 ENCOUNTER — TELEPHONE (OUTPATIENT)
Dept: PRIMARY CARE | Facility: CLINIC | Age: 40
End: 2025-04-08
Payer: COMMERCIAL

## 2025-04-08 NOTE — TELEPHONE ENCOUNTER
PT is needing a follow up visit and is asking if he could have 4/17/25 in the morning because he has a follow up with Dr Perry in the afternoon the same day.

## 2025-04-09 NOTE — ED PROVIDER NOTES
HPI   Chief Complaint   Patient presents with    Chest Pain       Patient is a 39 m pmh opf tobacco use, recent surgery here for chest pain. Left sided now right. No le edma. No fever nausea vomiting cough.              Patient History   Past Medical History:   Diagnosis Date    Cancer (Multi) 04/2023    Fibromyalgia     Headache      Past Surgical History:   Procedure Laterality Date    EYE SURGERY  1989    FINGER SURGERY  2023    WISDOM TOOTH EXTRACTION  2022     Family History   Problem Relation Name Age of Onset    Alcohol abuse Mother       Social History     Tobacco Use    Smoking status: Every Day     Current packs/day: 0.50     Average packs/day: 0.5 packs/day for 20.0 years (10.0 ttl pk-yrs)     Types: Cigarettes    Smokeless tobacco: Never   Vaping Use    Vaping status: Never Used   Substance Use Topics    Alcohol use: Yes     Alcohol/week: 30.0 standard drinks of alcohol     Types: 30 Cans of beer per week     Comment: EVERY OTHER DAY -CASUAL    Drug use: Yes     Types: Marijuana       Physical Exam   ED Triage Vitals   Temperature Heart Rate Respirations BP   04/05/25 1940 04/05/25 1940 04/05/25 1940 04/05/25 1940   36.6 °C (97.9 °F) 82 18 138/86      Pulse Ox Temp src Heart Rate Source Patient Position   04/05/25 1940 -- 04/05/25 2330 --   100 %  Monitor       BP Location FiO2 (%)     -- --             Physical Exam  Vitals and nursing note reviewed.   Constitutional:       Appearance: Normal appearance.   HENT:      Head: Normocephalic and atraumatic.      Right Ear: External ear normal.      Left Ear: External ear normal.      Nose: Nose normal.      Mouth/Throat:      Mouth: Mucous membranes are moist.   Cardiovascular:      Rate and Rhythm: Normal rate and regular rhythm.      Pulses: Normal pulses.      Heart sounds: Normal heart sounds.   Pulmonary:      Effort: Pulmonary effort is normal.      Breath sounds: Normal breath sounds.   Abdominal:      General: Abdomen is flat. There is no distension.       Palpations: Abdomen is soft.      Tenderness: There is no abdominal tenderness. There is no guarding or rebound.   Musculoskeletal:         General: No deformity.   Skin:     General: Skin is warm.   Neurological:      General: No focal deficit present.      Mental Status: He is alert and oriented to person, place, and time. Mental status is at baseline.           ED Course & MDM   ED Course as of 04/08/25 2112   Sat Apr 05, 2025 2050 ECG 12 lead  EKG shows normal sinus rhythm rate 83 normal intervals no ST-T wave changes [SE]   2106 Patient signed out to me pending chest pain workup as well as CT PE protocol and if all unremarkable can be discharged home. [ME]   Sun Apr 06, 2025 0041 Workup is unremarkable, will be able to discharge the patient home as per the signout plan. [ME]      ED Course User Index  [ME] Hardeep Vizcarra DO  [SE] Tevin Bell MD         Diagnoses as of 04/08/25 2112   Chest pain, unspecified type                 No data recorded     [place] Coma Scale Score: 15 (04/05/25 2029 : Joelle De Leon, RN)                           Medical Decision Making  Patient presents for evaluation of chest pain.  Recently postop.  Given recent procedure CT imaging for pulmonary embolism was ordered.  Patient signed out to oncoming physician pending CT PE.    Amount and/or Complexity of Data Reviewed  Radiology: ordered.  ECG/medicine tests: ordered and independent interpretation performed. Decision-making details documented in ED Course.    Risk  Prescription drug management.  Decision regarding hospitalization.        Procedure  Procedures     Tevin Bell MD  04/08/25 2112

## 2025-04-12 NOTE — PROGRESS NOTES
Subjective     Rufino Medeiros is a 38 y.o. male who presents for the following: Wound Check. Patient is s/p Mohs of the left middle finger bed in March 2023. Since last seen on 12/2023, he denies any significant changes. He continues to note shorting of his nail bed. He also notes paroxysmal episodes of pain and sensations of numbness in his middle finger that occasionally bother him at work (he is a ).     Review of Systems:  No other skin or systemic complaints other than what is documented elsewhere in the note.    The following portions of the chart were reviewed this encounter and updated as appropriate:          Skin Cancer History  -Squamous Cell Carcinoma of the left middle finger nail bed. Year Diagnosed: 2023. February. Treatment(s): Mohs.Treated by: MD Jan Arellano MD Jeremy Bordeaux, MD. Assessment: Left third digit atypical squamous proliferation, unable to tolerate local anesthesia. Differential diagnosis  includes squamous cell carcinoma, subungual keratoacanthoma, onycholeminal carcinoma and cannot exclude pseudocarcinomatous hyperplasia overlying dermal mycotic infection. He also had underlying osteomyelitis requiring IV ABX. 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 Treated with Mohs  3/3/23 by Dr. Metzger         Specialty Problems          Dermatology Problems    Blisters with epidermal loss due to burn (second degree) of two or more digits of hand including thumb     Formatting of this note might be different from the original. 2 Deg Burn Fingr w/ Thumb         Blisters with epidermal loss due to burn (second degree) of palm of hand    Hemangioma of other sites    Melanocytic nevi of right lower limb, including hip    Melanocytic nevi of trunk    Other melanin hyperpigmentation    Personal history of other malignant neoplasm of skin    Scar condition and fibrosis of skin    Squamous cell carcinoma of skin of left  upper limb, including shoulder    SCC (squamous cell carcinoma)        Objective   Well appearing patient in no apparent distress; mood and affect are within normal limits.    A focused skin examination was performed. All findings within normal limits unless otherwise noted below.    Assessment/Plan   1. Scar conditions and fibrosis of skin  Left 3rd Finger Nail Bed  On the left third finger there is distal loss of the nail plate medially with proximal nail thickening.  On the tip of the middle finger is a hole-like opening and deep discoloration c/f foreign body.  Hyperemia of the distal nail beds.    + S/p Mohs of left middle nailbed on 3/2023 for suspected foreign body showing parakeratosis.   ++ No evidence of SCC of examination today  ++ Reviewed that his finger pain is likely 2/2 to the splinter we saw on examination. We recommended letting the splinter come out on its own.  ++ Discussed that the other nail changes are due to residual scarring of the nail from biopsy.     RTC in 2 months to see if the splinter comes out with time.    RTC in 1 year for monitoring of the finger for SCC.    Joelle Corona MD  PGY-2, Dermatology     I saw and evaluated the patient, reviewed the resident's notes and discussed the patient's managment.  I agree with the documented findings and plan of care.   Jan Metzger MD, PhD     0 = understands/communicates without difficulty

## 2025-04-17 ENCOUNTER — APPOINTMENT (OUTPATIENT)
Dept: SURGERY | Facility: CLINIC | Age: 40
End: 2025-04-17
Payer: COMMERCIAL

## 2025-04-21 ENCOUNTER — APPOINTMENT (OUTPATIENT)
Dept: SURGERY | Facility: CLINIC | Age: 40
End: 2025-04-21
Payer: COMMERCIAL

## 2025-04-21 DIAGNOSIS — D23.4 CYST, DERMOID, SCALP AND NECK: Primary | ICD-10-CM

## 2025-04-21 PROCEDURE — 99024 POSTOP FOLLOW-UP VISIT: CPT | Performed by: SURGERY

## 2025-04-21 NOTE — PROGRESS NOTES
Subjective   Patient ID: Rufino Medeiros is a 39 y.o. male who presents for Post-op (STM, c/o tenderness).    HPI history of excision of soft tissue mass in the posterior neck  Review of Systems  Physical Exam  Incision healed by the primary intention  Objective     No diagnosis found.   Problem List[1]   RX Allergies[2]   Medication Documentation Review Audit       Reviewed by Jerry Perry MD (Physician) on 04/21/25 at 1329      Medication Order Taking? Sig Documenting Provider Last Dose Status   acetaminophen (Tylenol Extra Strength) 500 mg tablet [id number] No Take 2 tablets (1,000 mg) by mouth if needed.   Patient not taking: Reported on 3/13/2025    Historical MD Oniel Taking Active   albuterol (Ventolin HFA) 90 mcg/actuation inhaler [id number] No Inhale 2 puffs every 6 hours if needed for wheezing. Kacey Roca, APRN-CNP Taking Active   baclofen (Lioresal) 10 mg tablet [id number] No Take 1 tablet (10 mg) by mouth 3 times a day as needed for muscle spasms.   Patient not taking: Reported on 8/21/2024    Samreen Ovalles MD Not Taking Active   cyanocobalamin, vitamin B-12, (VITAMIN B-12 ORAL) [id number] No Take by mouth.   Patient not taking: Reported on 3/13/2025    Pranav Engle MD Not Taking Active   DULoxetine (Cymbalta) 20 mg DR capsule [id number] No Take 1 capsule (20 mg) by mouth once daily.   Patient not taking: Reported on 3/13/2025    Historical MD Oniel Taking Active   lidocaine (Lidoderm) 5 % patch [id number] No Place 1 patch on the skin once daily.   Patient not taking: Reported on 3/13/2025    Historical MD Oniel Not Taking Active   meloxicam (Mobic) 15 mg tablet [id number] No Take 1 tablet (15 mg) by mouth once daily.   Patient not taking: Reported on 3/13/2025    Pranav Engle MD Not Taking Active   methylPREDNISolone (Medrol Dospak) 4 mg tablets [id number] No Follow schedule on package instructions   Patient not taking: Reported on 12/8/2023    Braden Davidson PA-C Not  Taking Active   nicotine (Nicoderm CQ) 14 mg/24 hr patch [id number]  Place 1 patch over 24 hours on the skin once every 24 hours. Kacey VERONICA Tirado   24 235   nicotine (Nicoderm CQ) 21 mg/24 hr patch [id number]  Place 1 patch over 24 hours on the skin once every 24 hours. Kacey ROYAL VERONICA Roca   24 235   nicotine (Nicoderm CQ) 7 mg/24 hr patch [id number]  Place 1 patch over 24 hours on the skin once every 24 hours. KaceyVERONICA Concepcion   24 235   omeprazole (PriLOSEC) 40 mg DR capsule [id number]  Take 1 capsule (40 mg) by mouth once daily. Samreen Ovalles MD  Active   ondansetron ODT (Zofran-ODT) 4 mg disintegrating tablet [id number] No Take 1 tablet (4 mg) by mouth every 8 hours if needed for nausea or vomiting.   Patient not taking: Reported on 2024    Alex Esqueda MD Not Taking Active   tiotropium (Spiriva) 18 mcg inhalation capsule [id number] No Place 1 capsule (18 mcg) into inhaler and inhale once daily. Samreen Ovalles MD Taking  24   tiotropium-olodateroL (Stiolto Respimat) 2.5-2.5 mcg/actuation mist inhaler [id number] No Inhale 2 Inhalations once daily.   Patient not taking: Reported on 2024    VERONICA Zamora Not Taking Active                    Medical History[3]  Tobacco Use History[4]  Family History[5]   Surgical History[6]    Assessment/Plan     No surgical complications, follow-up as needed    Jerry Perry MD          [1]   Patient Active Problem List  Diagnosis    SCC (squamous cell carcinoma)    Alcohol use    Screen for STD (sexually transmitted disease)    Routine physical examination    Sore throat    Oral aphthous ulcer    Squamous cell carcinoma of skin of left upper limb, including shoulder    Aphthous ulcer    Scar condition and fibrosis of skin    Personal history of other malignant neoplasm of skin    Melanocytic nevi of trunk    Melanocytic nevi of right lower limb, including hip     Hemangioma of other sites    Blisters with epidermal loss due to burn (second degree) of two or more digits of hand including thumb    Blisters with epidermal loss due to burn (second degree) of palm of hand    Other melanin hyperpigmentation    Back disorder    Other chronic pain    Osteomyelitis    Anxiety    Long term (current) use of antibiotics    Gastroesophageal reflux disease    Non-recurrent acute suppurative otitis media of both ears without spontaneous rupture of tympanic membranes    Muscle stiffness   [2]   Allergies  Allergen Reactions    Bee Venom Protein (Honey Bee) Hives    Diazepam Hives and Rash   [3]   Past Medical History:  Diagnosis Date    Cancer (Multi) 04/2023    Fibromyalgia     Headache    [4]   Social History  Tobacco Use   Smoking Status Every Day    Current packs/day: 0.50    Average packs/day: 0.5 packs/day for 20.0 years (10.0 ttl pk-yrs)    Types: Cigarettes   Smokeless Tobacco Never   [5]   Family History  Problem Relation Name Age of Onset    Alcohol abuse Mother     [6]   Past Surgical History:  Procedure Laterality Date    EYE SURGERY  1989    FINGER SURGERY  2023    WISDOM TOOTH EXTRACTION  2022

## 2025-05-06 ENCOUNTER — APPOINTMENT (OUTPATIENT)
Dept: PRIMARY CARE | Facility: CLINIC | Age: 40
End: 2025-05-06
Payer: COMMERCIAL

## 2025-08-05 ENCOUNTER — TELEPHONE (OUTPATIENT)
Dept: PRIMARY CARE | Facility: CLINIC | Age: 40
End: 2025-08-05
Payer: COMMERCIAL

## 2026-01-06 ENCOUNTER — APPOINTMENT (OUTPATIENT)
Dept: DERMATOLOGY | Facility: CLINIC | Age: 41
End: 2026-01-06
Payer: COMMERCIAL